# Patient Record
Sex: MALE | Race: WHITE | Employment: FULL TIME | URBAN - METROPOLITAN AREA
[De-identification: names, ages, dates, MRNs, and addresses within clinical notes are randomized per-mention and may not be internally consistent; named-entity substitution may affect disease eponyms.]

---

## 2024-04-30 ENCOUNTER — OFFICE VISIT (OUTPATIENT)
Dept: PRIMARY CARE | Facility: CLINIC | Age: 54
End: 2024-04-30
Payer: COMMERCIAL

## 2024-04-30 VITALS
TEMPERATURE: 98.1 F | HEIGHT: 74 IN | HEART RATE: 84 BPM | SYSTOLIC BLOOD PRESSURE: 168 MMHG | DIASTOLIC BLOOD PRESSURE: 96 MMHG | OXYGEN SATURATION: 98 % | WEIGHT: 315 LBS | BODY MASS INDEX: 40.43 KG/M2

## 2024-04-30 DIAGNOSIS — E11.9 TYPE 2 DIABETES MELLITUS WITHOUT COMPLICATION, WITHOUT LONG-TERM CURRENT USE OF INSULIN (MULTI): ICD-10-CM

## 2024-04-30 DIAGNOSIS — I10 PRIMARY HYPERTENSION: ICD-10-CM

## 2024-04-30 DIAGNOSIS — E66.01 CLASS 3 SEVERE OBESITY WITH SERIOUS COMORBIDITY AND BODY MASS INDEX (BMI) OF 40.0 TO 44.9 IN ADULT, UNSPECIFIED OBESITY TYPE (MULTI): ICD-10-CM

## 2024-04-30 DIAGNOSIS — M1A.09X0 CHRONIC GOUT OF MULTIPLE SITES, UNSPECIFIED CAUSE: ICD-10-CM

## 2024-04-30 DIAGNOSIS — Z00.00 ANNUAL PHYSICAL EXAM: Primary | ICD-10-CM

## 2024-04-30 DIAGNOSIS — M48.02 SPINAL STENOSIS IN CERVICAL REGION: ICD-10-CM

## 2024-04-30 PROCEDURE — 3080F DIAST BP >= 90 MM HG: CPT

## 2024-04-30 PROCEDURE — 4010F ACE/ARB THERAPY RXD/TAKEN: CPT

## 2024-04-30 PROCEDURE — 3077F SYST BP >= 140 MM HG: CPT

## 2024-04-30 PROCEDURE — 3008F BODY MASS INDEX DOCD: CPT

## 2024-04-30 PROCEDURE — 99213 OFFICE O/P EST LOW 20 MIN: CPT

## 2024-04-30 PROCEDURE — 99386 PREV VISIT NEW AGE 40-64: CPT

## 2024-04-30 RX ORDER — METFORMIN HYDROCHLORIDE 500 MG/1
1000 TABLET ORAL
COMMUNITY
Start: 2024-03-04 | End: 2024-06-05 | Stop reason: SDUPTHER

## 2024-04-30 RX ORDER — HYDROCHLOROTHIAZIDE 25 MG/1
25 TABLET ORAL DAILY
Qty: 30 TABLET | Refills: 1 | Status: SHIPPED | OUTPATIENT
Start: 2024-04-30 | End: 2024-06-05 | Stop reason: SDUPTHER

## 2024-04-30 RX ORDER — LISINOPRIL AND HYDROCHLOROTHIAZIDE 12.5; 2 MG/1; MG/1
1 TABLET ORAL DAILY
COMMUNITY
Start: 2024-04-15 | End: 2024-04-30 | Stop reason: DRUGHIGH

## 2024-04-30 RX ORDER — AMLODIPINE BESYLATE 10 MG/1
10 TABLET ORAL DAILY
COMMUNITY
Start: 2024-03-26 | End: 2024-06-05 | Stop reason: SDUPTHER

## 2024-04-30 RX ORDER — GABAPENTIN 300 MG/1
300 CAPSULE ORAL 3 TIMES DAILY
COMMUNITY
Start: 2024-04-15

## 2024-04-30 RX ORDER — LISINOPRIL 40 MG/1
40 TABLET ORAL DAILY
Qty: 30 TABLET | Refills: 1 | Status: SHIPPED | OUTPATIENT
Start: 2024-04-30 | End: 2024-06-05 | Stop reason: SDUPTHER

## 2024-04-30 RX ORDER — METHOCARBAMOL 500 MG/1
500 TABLET, FILM COATED ORAL 3 TIMES DAILY
COMMUNITY
Start: 2024-03-26 | End: 2024-04-05 | Stop reason: WASHOUT

## 2024-04-30 RX ORDER — ALLOPURINOL 300 MG/1
300 TABLET ORAL DAILY
COMMUNITY
Start: 2024-03-04 | End: 2024-06-05 | Stop reason: SDUPTHER

## 2024-04-30 RX ORDER — GLIPIZIDE 5 MG/1
TABLET ORAL
COMMUNITY
Start: 2024-03-04 | End: 2024-06-05 | Stop reason: SDUPTHER

## 2024-04-30 ASSESSMENT — PATIENT HEALTH QUESTIONNAIRE - PHQ9
SUM OF ALL RESPONSES TO PHQ9 QUESTIONS 1 AND 2: 1
10. IF YOU CHECKED OFF ANY PROBLEMS, HOW DIFFICULT HAVE THESE PROBLEMS MADE IT FOR YOU TO DO YOUR WORK, TAKE CARE OF THINGS AT HOME, OR GET ALONG WITH OTHER PEOPLE: NOT DIFFICULT AT ALL
1. LITTLE INTEREST OR PLEASURE IN DOING THINGS: SEVERAL DAYS
2. FEELING DOWN, DEPRESSED OR HOPELESS: NOT AT ALL

## 2024-04-30 ASSESSMENT — PAIN SCALES - GENERAL: PAINLEVEL: 5
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
## 2024-05-07 ASSESSMENT — ENCOUNTER SYMPTOMS
EYE ITCHING: 0
HEADACHES: 0
DIARRHEA: 0
NAUSEA: 0
NECK PAIN: 1
COUGH: 0
PALPITATIONS: 0
ADENOPATHY: 0
RECTAL PAIN: 0
ABDOMINAL PAIN: 0
DIZZINESS: 0
TROUBLE SWALLOWING: 0
DECREASED CONCENTRATION: 0
ARTHRALGIAS: 0
SHORTNESS OF BREATH: 0
PHOTOPHOBIA: 0
LIGHT-HEADEDNESS: 0
SINUS PRESSURE: 0
JOINT SWELLING: 0
SPEECH DIFFICULTY: 0
HEMATURIA: 0
WHEEZING: 0
EYE REDNESS: 0
WEAKNESS: 0
SORE THROAT: 0
POLYPHAGIA: 0
APPETITE CHANGE: 0
WOUND: 0
MYALGIAS: 0
NERVOUS/ANXIOUS: 0
COLOR CHANGE: 0
FEVER: 0
NUMBNESS: 1
DYSURIA: 0
CHILLS: 0
FREQUENCY: 0
HYPERACTIVE: 0
SLEEP DISTURBANCE: 0
CHEST TIGHTNESS: 0
FATIGUE: 0
ACTIVITY CHANGE: 0
UNEXPECTED WEIGHT CHANGE: 0
BLOOD IN STOOL: 0
ANAL BLEEDING: 0
CONSTIPATION: 0
TREMORS: 0
VOMITING: 0
POLYDIPSIA: 0
BACK PAIN: 0
BRUISES/BLEEDS EASILY: 0
EYE PAIN: 0

## 2024-05-08 ENCOUNTER — DOCUMENTATION (OUTPATIENT)
Dept: PRIMARY CARE | Facility: CLINIC | Age: 54
End: 2024-05-08
Payer: COMMERCIAL

## 2024-05-08 DIAGNOSIS — Z00.00 ANNUAL PHYSICAL EXAM: ICD-10-CM

## 2024-05-13 ENCOUNTER — OFFICE VISIT (OUTPATIENT)
Dept: PRIMARY CARE | Facility: CLINIC | Age: 54
End: 2024-05-13
Payer: COMMERCIAL

## 2024-05-13 VITALS — OXYGEN SATURATION: 97 % | SYSTOLIC BLOOD PRESSURE: 160 MMHG | HEART RATE: 101 BPM | DIASTOLIC BLOOD PRESSURE: 90 MMHG

## 2024-05-13 DIAGNOSIS — E11.9 TYPE 2 DIABETES MELLITUS WITHOUT COMPLICATION, WITHOUT LONG-TERM CURRENT USE OF INSULIN (MULTI): Primary | ICD-10-CM

## 2024-05-13 PROCEDURE — 99214 OFFICE O/P EST MOD 30 MIN: CPT

## 2024-05-13 PROCEDURE — 4010F ACE/ARB THERAPY RXD/TAKEN: CPT

## 2024-05-13 PROCEDURE — 3077F SYST BP >= 140 MM HG: CPT

## 2024-05-13 PROCEDURE — 3008F BODY MASS INDEX DOCD: CPT

## 2024-05-13 PROCEDURE — 3080F DIAST BP >= 90 MM HG: CPT

## 2024-05-13 RX ORDER — LISINOPRIL AND HYDROCHLOROTHIAZIDE 12.5; 2 MG/1; MG/1
1 TABLET ORAL
COMMUNITY
Start: 2024-04-15 | End: 2024-05-13 | Stop reason: ALTCHOICE

## 2024-05-13 ASSESSMENT — PATIENT HEALTH QUESTIONNAIRE - PHQ9
2. FEELING DOWN, DEPRESSED OR HOPELESS: NOT AT ALL
SUM OF ALL RESPONSES TO PHQ9 QUESTIONS 1 AND 2: 0
1. LITTLE INTEREST OR PLEASURE IN DOING THINGS: NOT AT ALL

## 2024-05-13 ASSESSMENT — ENCOUNTER SYMPTOMS
APPETITE CHANGE: 0
FEVER: 0
CHEST TIGHTNESS: 0
LIGHT-HEADEDNESS: 0
POLYPHAGIA: 0
COUGH: 0
HEADACHES: 0
ACTIVITY CHANGE: 0
UNEXPECTED WEIGHT CHANGE: 0
GASTROINTESTINAL NEGATIVE: 1
DIZZINESS: 0
SHORTNESS OF BREATH: 0
POLYDIPSIA: 0
PALPITATIONS: 0
FATIGUE: 0

## 2024-05-13 ASSESSMENT — PAIN SCALES - GENERAL: PAINLEVEL: 5

## 2024-05-13 NOTE — PROGRESS NOTES
Subjective   Patient ID: Juan Amin is a 53 y.o. male who presents for Follow-up (Blood work ).    YOLANDA Antonio presents to discussed abnormal lab work.  His A1C is 9.8%, he is a new patient to this provider, first meeting on 4/30/2024.  No previous labs or medical records to review receive from previous provider, request sent at his last visit.  He does not recall what his previous A1C results have been.  He is currently taking metformin 1,000 mg BID and glipizide 5 mg BID for diabetes. Reports his diet needs improvement and due to his weight, getting in extra movement/exercising is a great struggle. He will be seeing pain management on 6/3/2024 which may provide some pain relief options so he may be come more active.     Review of Systems   Constitutional:  Negative for activity change, appetite change, fatigue, fever and unexpected weight change.   Respiratory:  Negative for cough, chest tightness and shortness of breath.    Cardiovascular:  Negative for chest pain, palpitations and leg swelling.   Gastrointestinal: Negative.    Endocrine: Negative for polydipsia, polyphagia and polyuria.   Genitourinary: Negative.    Neurological:  Negative for dizziness, light-headedness and headaches.       Objective   /90 (BP Location: Left arm)   Pulse 101   SpO2 97%     Physical Exam  Vitals and nursing note reviewed.   Constitutional:       General: He is not in acute distress.     Appearance: Normal appearance.   Cardiovascular:      Rate and Rhythm: Normal rate and regular rhythm.      Heart sounds: Normal heart sounds.   Pulmonary:      Effort: Pulmonary effort is normal.      Breath sounds: Normal breath sounds.   Skin:     General: Skin is warm and dry.   Neurological:      General: No focal deficit present.      Mental Status: He is alert.         Assessment/Plan   Problem List Items Addressed This Visit    None  Visit Diagnoses         Codes    Type 2 diabetes mellitus without complication, without  long-term current use of insulin (Multi)    -  Primary  Chronic condition, not well controlled.  A1C 9.8%  Initiate Ozempic 0.25 mg as discussed x 4 weeks.  Explained intended effects, potential side effects, and schedule of dosages of the medication.  Continue metformin 1,000 mg BID and glipizide 5 mg BID as prescribed.   Discussed diet and exercise interventions at length.  Discussed consult with diabetic educator for further diet intervention.  Follow up in 3 weeks as previously scheduled to re-evaluate BP and we will plan to increase Ozempic dose if well tolerated.  E11.9    Relevant Medications    semaglutide 0.25 mg or 0.5 mg (2 mg/3 mL) pen injector (Start on 5/19/2024)

## 2024-05-24 PROBLEM — N52.1 ERECTILE DYSFUNCTION DUE TO TYPE 2 DIABETES MELLITUS (MULTI): Status: ACTIVE | Noted: 2019-02-22

## 2024-05-24 PROBLEM — D64.9 ANEMIA: Status: ACTIVE | Noted: 2022-04-09

## 2024-05-24 PROBLEM — E11.69 ERECTILE DYSFUNCTION DUE TO TYPE 2 DIABETES MELLITUS (MULTI): Status: ACTIVE | Noted: 2019-02-22

## 2024-05-24 PROBLEM — I62.9 INTRACRANIAL HEMORRHAGE (MULTI): Status: ACTIVE | Noted: 2023-01-26

## 2024-05-24 PROBLEM — S46.212A RUPTURE OF LEFT PROXIMAL BICEPS TENDON: Status: ACTIVE | Noted: 2021-05-20

## 2024-05-24 PROBLEM — E11.9 DIABETES MELLITUS (MULTI): Status: ACTIVE | Noted: 2019-11-29

## 2024-05-24 PROBLEM — M75.42 IMPINGEMENT SYNDROME OF LEFT SHOULDER: Status: ACTIVE | Noted: 2021-05-23

## 2024-05-24 PROBLEM — N52.9 ERECTILE DYSFUNCTION: Status: ACTIVE | Noted: 2020-03-17

## 2024-05-24 PROBLEM — M99.01 CERVICAL (NECK) REGION SOMATIC DYSFUNCTION: Status: ACTIVE | Noted: 2019-09-16

## 2024-05-24 PROBLEM — I10 ESSENTIAL HYPERTENSION: Status: ACTIVE | Noted: 2018-11-09

## 2024-05-24 PROBLEM — E53.8 VITAMIN B12 DEFICIENCY: Status: ACTIVE | Noted: 2021-12-10

## 2024-05-24 PROBLEM — E11.9 TYPE 2 DIABETES MELLITUS WITHOUT COMPLICATION, WITHOUT LONG-TERM CURRENT USE OF INSULIN (MULTI): Status: ACTIVE | Noted: 2019-09-16

## 2024-05-24 PROBLEM — E78.00 HIGH CHOLESTEROL: Status: ACTIVE | Noted: 2022-04-09

## 2024-05-24 PROBLEM — M1A.9XX0 CHRONIC GOUT WITHOUT TOPHUS: Status: ACTIVE | Noted: 2020-03-17

## 2024-05-24 PROBLEM — I21.4 NSTEMI (NON-ST ELEVATED MYOCARDIAL INFARCTION) (MULTI): Status: ACTIVE | Noted: 2022-11-22

## 2024-05-24 PROBLEM — M20.40 HAMMER TOE: Status: ACTIVE | Noted: 2019-11-29

## 2024-05-24 PROBLEM — E66.01 MORBID OBESITY (MULTI): Status: ACTIVE | Noted: 2019-09-16

## 2024-05-24 PROBLEM — E55.9 VITAMIN D DEFICIENCY: Status: ACTIVE | Noted: 2021-12-10

## 2024-05-28 PROBLEM — N52.9 ERECTILE DYSFUNCTION: Status: RESOLVED | Noted: 2020-03-17 | Resolved: 2024-05-28

## 2024-05-28 PROBLEM — M20.40 HAMMER TOE: Status: RESOLVED | Noted: 2019-11-29 | Resolved: 2024-05-28

## 2024-06-03 ENCOUNTER — OFFICE VISIT (OUTPATIENT)
Dept: PAIN MEDICINE | Facility: CLINIC | Age: 54
End: 2024-06-03
Payer: COMMERCIAL

## 2024-06-03 VITALS
OXYGEN SATURATION: 96 % | DIASTOLIC BLOOD PRESSURE: 80 MMHG | HEART RATE: 87 BPM | RESPIRATION RATE: 18 BRPM | BODY MASS INDEX: 40.43 KG/M2 | WEIGHT: 315 LBS | SYSTOLIC BLOOD PRESSURE: 160 MMHG | HEIGHT: 74 IN

## 2024-06-03 DIAGNOSIS — M48.02 SPINAL STENOSIS IN CERVICAL REGION: ICD-10-CM

## 2024-06-03 DIAGNOSIS — M47.812 CERVICAL SPONDYLOSIS WITHOUT MYELOPATHY: ICD-10-CM

## 2024-06-03 DIAGNOSIS — M54.12 CERVICAL RADICULAR PAIN: Primary | ICD-10-CM

## 2024-06-03 PROCEDURE — 3008F BODY MASS INDEX DOCD: CPT | Performed by: ANESTHESIOLOGY

## 2024-06-03 PROCEDURE — 1036F TOBACCO NON-USER: CPT | Performed by: ANESTHESIOLOGY

## 2024-06-03 PROCEDURE — 3077F SYST BP >= 140 MM HG: CPT | Performed by: ANESTHESIOLOGY

## 2024-06-03 PROCEDURE — 99204 OFFICE O/P NEW MOD 45 MIN: CPT | Performed by: ANESTHESIOLOGY

## 2024-06-03 PROCEDURE — 99214 OFFICE O/P EST MOD 30 MIN: CPT | Performed by: ANESTHESIOLOGY

## 2024-06-03 PROCEDURE — 3079F DIAST BP 80-89 MM HG: CPT | Performed by: ANESTHESIOLOGY

## 2024-06-03 PROCEDURE — 4010F ACE/ARB THERAPY RXD/TAKEN: CPT | Performed by: ANESTHESIOLOGY

## 2024-06-03 ASSESSMENT — LIFESTYLE VARIABLES
SKIP TO QUESTIONS 9-10: 0
AUDIT-C TOTAL SCORE: 2
HOW MANY STANDARD DRINKS CONTAINING ALCOHOL DO YOU HAVE ON A TYPICAL DAY: 1 OR 2
HOW OFTEN DO YOU HAVE SIX OR MORE DRINKS ON ONE OCCASION: LESS THAN MONTHLY
HOW OFTEN DO YOU HAVE A DRINK CONTAINING ALCOHOL: MONTHLY OR LESS

## 2024-06-03 ASSESSMENT — PATIENT HEALTH QUESTIONNAIRE - PHQ9
2. FEELING DOWN, DEPRESSED OR HOPELESS: NOT AT ALL
SUM OF ALL RESPONSES TO PHQ9 QUESTIONS 1 & 2: 0
1. LITTLE INTEREST OR PLEASURE IN DOING THINGS: NOT AT ALL

## 2024-06-03 ASSESSMENT — PAIN SCALES - GENERAL
PAINLEVEL_OUTOF10: 6
PAINLEVEL: 6

## 2024-06-03 ASSESSMENT — ENCOUNTER SYMPTOMS
CARDIOVASCULAR NEGATIVE: 1
WEAKNESS: 1
PSYCHIATRIC NEGATIVE: 1
EYES NEGATIVE: 1
NECK PAIN: 1
RESPIRATORY NEGATIVE: 1
ENDOCRINE NEGATIVE: 1
GASTROINTESTINAL NEGATIVE: 1
NUMBNESS: 1
CONSTITUTIONAL NEGATIVE: 1
HEMATOLOGIC/LYMPHATIC NEGATIVE: 1
NECK STIFFNESS: 1
MYALGIAS: 1
ALLERGIC/IMMUNOLOGIC NEGATIVE: 1

## 2024-06-03 ASSESSMENT — PAIN - FUNCTIONAL ASSESSMENT: PAIN_FUNCTIONAL_ASSESSMENT: 0-10

## 2024-06-03 ASSESSMENT — PAIN DESCRIPTION - DESCRIPTORS: DESCRIPTORS: NUMBNESS

## 2024-06-03 NOTE — PROGRESS NOTES
Subjective   Patient ID: Juan Amin is a 53 y.o. male who presents for Neck Pain.  Neck Pain   Associated symptoms include numbness and weakness.   Patient here today for a new patient evaluation of his neck and arm pain.  He was diagnosed with stenosis in 2014 and it was not constant at that time.  He was offered surgery in 2014 and he did not move forward with it at that time.  He rats his pain as a 6/10.  However, he electednot to have surgery because his pain was not constant.  When he move back to [place] recently he started to have pain in the left forearm that felt like a deep bruise.  He then started to get numbness in his 4th and 5ht digits as well as weakness in the right hand.  He has pain in his neck and shoulders.  He has a very hard time lifting any thing and he feels weak.  Writing and simple simple hand tasks have become very hard for him.    He works full time as a  for [employer] and if he looks over his right shoulder it will worsen the pain.    He reports that when he lays down his feet will go numb.  If he take shis shoes off he gets a numbness in his feet.  He also has noticed that his gait is off and he learn to the side.  He also had a fall at work a few weeks ago.      Review of Systems   Constitutional: Negative.    HENT: Negative.     Eyes: Negative.    Respiratory: Negative.     Cardiovascular: Negative.    Gastrointestinal: Negative.    Endocrine: Negative.    Genitourinary: Negative.    Musculoskeletal:  Positive for myalgias, neck pain and neck stiffness.   Skin: Negative.    Allergic/Immunologic: Negative.    Neurological:  Positive for weakness and numbness.   Hematological: Negative.    Psychiatric/Behavioral: Negative.         Objective   Physical Exam  Vitals and nursing note reviewed.   Constitutional:       Appearance: Normal appearance.   HENT:      Head: Normocephalic and atraumatic.      Right Ear: Ear canal and external ear normal.      Left Ear: Ear canal and external  ear normal.      Nose: Nose normal.      Mouth/Throat:      Mouth: Mucous membranes are moist.      Pharynx: Oropharynx is clear.   Eyes:      Conjunctiva/sclera: Conjunctivae normal.      Pupils: Pupils are equal, round, and reactive to light.   Cardiovascular:      Rate and Rhythm: Normal rate.   Pulmonary:      Effort: Pulmonary effort is normal. No respiratory distress.   Musculoskeletal:      Cervical back: Normal range of motion and neck supple.      Lumbar back: Tenderness present. Normal range of motion.   Skin:     General: Skin is warm and dry.   Neurological:      Mental Status: He is alert and oriented to person, place, and time.      Sensory: Sensory deficit (Bilateral C8 Deficit) present.      Motor: Weakness (wrist strength 4/5) present.      Coordination: Coordination is intact.      Gait: Gait is intact.      Deep Tendon Reflexes: Babinski sign absent on the right side. Babinski sign absent on the left side.      Reflex Scores:       Bicep reflexes are 1+ on the right side and 1+ on the left side.       Brachioradialis reflexes are 1+ on the right side and 1+ on the left side.       Patellar reflexes are 2+ on the right side and 2+ on the left side.     Comments: Barbosa Sign (-) BL  Spurlings (+) right   Psychiatric:         Mood and Affect: Mood normal.         Thought Content: Thought content normal.         Assessment/Plan   Problem List Items Addressed This Visit    None  Visit Diagnoses         Codes    Cervical radicular pain    -  Primary M54.12    Relevant Orders    MR cervical spine wo IV contrast    Spinal stenosis in cervical region     M48.02    Relevant Orders    MR cervical spine wo IV contrast    Cervical spondylosis without myelopathy     M47.812          I nice discussion with the patient today our plan will be as follows.    Radiology: Patient with a previous history of cervical spinal stenosis needing decompression in the past now presenting with worsening weakness and numbness  in upper and lower extremities as well as gait instability.  I am concerned that the patient does have cord compression and has signs of myelopathy.    I will send the patient for stat noncontrasted cervical MRI.    Physically: I will have the patient hold off on any physical therapy at this time given his acute neurological deficits until advanced imaging has been completed.    Psychologically: No issues at this time.    Medication: No changes at this time.    Duration: Worsening over the last 6 months.    Intervention: We will hold off on any intervention until stat MRI has been completed.  If there is any cord compression or signs of myelomalacia I will refer to spine surgery for surgical consultation.         Ector Crockett MD 06/03/24 1:07 PM

## 2024-06-05 ENCOUNTER — OFFICE VISIT (OUTPATIENT)
Dept: PRIMARY CARE | Facility: CLINIC | Age: 54
End: 2024-06-05
Payer: COMMERCIAL

## 2024-06-05 ENCOUNTER — HOSPITAL ENCOUNTER (OUTPATIENT)
Dept: RADIOLOGY | Facility: HOSPITAL | Age: 54
Discharge: HOME | End: 2024-06-05
Payer: COMMERCIAL

## 2024-06-05 VITALS
SYSTOLIC BLOOD PRESSURE: 150 MMHG | BODY MASS INDEX: 44.02 KG/M2 | OXYGEN SATURATION: 96 % | TEMPERATURE: 97.8 F | HEART RATE: 77 BPM | WEIGHT: 315 LBS | DIASTOLIC BLOOD PRESSURE: 92 MMHG

## 2024-06-05 DIAGNOSIS — M1A.09X0 CHRONIC GOUT OF MULTIPLE SITES, UNSPECIFIED CAUSE: ICD-10-CM

## 2024-06-05 DIAGNOSIS — M54.12 CERVICAL RADICULAR PAIN: ICD-10-CM

## 2024-06-05 DIAGNOSIS — E11.9 TYPE 2 DIABETES MELLITUS WITHOUT COMPLICATION, WITHOUT LONG-TERM CURRENT USE OF INSULIN (MULTI): Primary | ICD-10-CM

## 2024-06-05 DIAGNOSIS — M48.02 SPINAL STENOSIS IN CERVICAL REGION: ICD-10-CM

## 2024-06-05 DIAGNOSIS — I10 PRIMARY HYPERTENSION: ICD-10-CM

## 2024-06-05 PROCEDURE — 4010F ACE/ARB THERAPY RXD/TAKEN: CPT

## 2024-06-05 PROCEDURE — 72141 MRI NECK SPINE W/O DYE: CPT

## 2024-06-05 PROCEDURE — 3077F SYST BP >= 140 MM HG: CPT

## 2024-06-05 PROCEDURE — 72141 MRI NECK SPINE W/O DYE: CPT | Performed by: STUDENT IN AN ORGANIZED HEALTH CARE EDUCATION/TRAINING PROGRAM

## 2024-06-05 PROCEDURE — 3008F BODY MASS INDEX DOCD: CPT

## 2024-06-05 PROCEDURE — 3080F DIAST BP >= 90 MM HG: CPT

## 2024-06-05 PROCEDURE — 99214 OFFICE O/P EST MOD 30 MIN: CPT

## 2024-06-05 RX ORDER — HYDROCHLOROTHIAZIDE 25 MG/1
25 TABLET ORAL DAILY
Qty: 90 TABLET | Refills: 1 | Status: SHIPPED | OUTPATIENT
Start: 2024-06-05 | End: 2024-12-02

## 2024-06-05 RX ORDER — GLIPIZIDE 5 MG/1
5 TABLET ORAL
Qty: 180 TABLET | Refills: 1 | Status: SHIPPED | OUTPATIENT
Start: 2024-06-05

## 2024-06-05 RX ORDER — ALLOPURINOL 300 MG/1
300 TABLET ORAL DAILY
Qty: 90 TABLET | Refills: 1 | Status: SHIPPED | OUTPATIENT
Start: 2024-06-05 | End: 2024-12-02

## 2024-06-05 RX ORDER — METFORMIN HYDROCHLORIDE 500 MG/1
1000 TABLET ORAL
Qty: 360 TABLET | Refills: 1 | Status: SHIPPED | OUTPATIENT
Start: 2024-06-05

## 2024-06-05 RX ORDER — LISINOPRIL 40 MG/1
40 TABLET ORAL DAILY
Qty: 90 TABLET | Refills: 1 | Status: SHIPPED | OUTPATIENT
Start: 2024-06-05 | End: 2024-12-02

## 2024-06-05 RX ORDER — AMLODIPINE BESYLATE 10 MG/1
10 TABLET ORAL DAILY
Qty: 90 TABLET | Refills: 1 | Status: SHIPPED | OUTPATIENT
Start: 2024-06-05

## 2024-06-05 ASSESSMENT — PAIN SCALES - GENERAL: PAINLEVEL: 6

## 2024-06-05 NOTE — PROGRESS NOTES
Subjective   Patient ID: Juan Amin is a 53 y.o. male who presents for Diabetes, Hypertension, and Follow-up.    HPI   Juan presents for diabetes follow up.  Started Ozempic 0.25 mg 4 weeks ago.  Reports improvement in hunger.  Has no really made any changes in his diet.   He denies any adverse effects related to Ozempic use. Has had a 4 lb weight loss.   Not currently checking home glucose levels as he did not have access to his monitor.  He now has access and will begin monitoring as discussed.     Review of Systems   Constitutional:  Negative for activity change, appetite change, fatigue, fever and unexpected weight change.   Respiratory:  Negative for cough, chest tightness and shortness of breath.    Cardiovascular:  Negative for chest pain, palpitations and leg swelling.   Gastrointestinal: Negative.    Endocrine: Negative for polydipsia, polyphagia and polyuria.   Genitourinary: Negative.    Neurological:  Negative for dizziness, light-headedness and headaches.         Objective   BP (!) 150/92 (BP Location: Left arm)   Pulse 77   Temp 36.6 °C (97.8 °F) (Temporal)   Wt (!) 153 kg (338 lb 3.2 oz)   SpO2 96%   BMI 44.02 kg/m²     Physical Exam  Vitals and nursing note reviewed.   Constitutional:       General: He is not in acute distress.     Appearance: Normal appearance. He is not ill-appearing.   Neck:      Vascular: No carotid bruit.   Cardiovascular:      Rate and Rhythm: Normal rate and regular rhythm.      Pulses: Normal pulses.      Heart sounds: Normal heart sounds, S1 normal and S2 normal. No murmur heard.  Pulmonary:      Effort: Pulmonary effort is normal. No respiratory distress.      Breath sounds: Normal breath sounds and air entry.   Musculoskeletal:      Cervical back: Normal range of motion and neck supple. No rigidity or tenderness.      Right lower leg: No edema.      Left lower leg: No edema.   Lymphadenopathy:      Cervical: No cervical adenopathy.   Skin:     General: Skin  is warm and dry.      Capillary Refill: Capillary refill takes less than 2 seconds.   Neurological:      General: No focal deficit present.      Mental Status: He is alert. Mental status is at baseline.   Psychiatric:         Behavior: Behavior is cooperative.         Assessment/Plan   Problem List Items Addressed This Visit             ICD-10-CM    Type 2 diabetes mellitus without complication, without long-term current use of insulin (Multi) - Primary  Increase Ozempic to 0.5 mg subcutaneous weekly as discussed.   Explained intended effects, potential side effects, and schedule of dosages of the medication.  Continue work on diet - recommend lots of fruits and vegetables, lean protein like chicken, turkey, fish, beans and Greek yogurt. Try to choose healthier carbohydrate options like oatmeal, wheat bread and pasta, sweet potatoes. Limit sugary treats.  Check a fasting sugar first thing in the AM once daily and keep a log of the results to bring to your next office visit.  Please contact office if your sugars are consistently >140.  Reevaluate in 3 weeks. For medication adjustment, will plan to increase Ozempic to 1 mg subcutaneous weekly at next visit.    E11.9    Relevant Medications    semaglutide 0.25 mg or 0.5 mg (2 mg/3 mL) pen injector    glipiZIDE (Glucotrol) 5 mg tablet    metFORMIN (Glucophage) 500 mg tablet     Other Visit Diagnoses         Codes    Chronic gout of multiple sites, unspecified cause      Chronic condition, well controlled at this visit.   Continue allopurinol as prescribed. No recent flairs.   Follow up in 6 months.    M1A.09X0    Relevant Medications    allopurinol (Zyloprim) 300 mg tablet    Primary hypertension      Chronic condition, needs tighter control.  He reports he is in pain related chronic neck pain and feeling stress as he has an MRI scheduled after this appointment.  Stress and pain both likely contributing to elevated BP today.   Continue hydrochlorothiazide 25 mg daily,  amlodipine 10 mg daily, and lisinopril 40 mg daily as prescribed.   Explained intended effects, potential side effects, and schedule of dosages of the medication. I10    Relevant Medications    amLODIPine (Norvasc) 10 mg tablet    hydroCHLOROthiazide (HYDRODiuril) 25 mg tablet    lisinopril 40 mg tablet

## 2024-06-06 DIAGNOSIS — M48.02 SPINAL STENOSIS, CERVICAL REGION: Primary | ICD-10-CM

## 2024-06-09 ASSESSMENT — ENCOUNTER SYMPTOMS
LIGHT-HEADEDNESS: 0
CHEST TIGHTNESS: 0
FATIGUE: 0
PALPITATIONS: 0
POLYDIPSIA: 0
FEVER: 0
SHORTNESS OF BREATH: 0
HEADACHES: 0
POLYPHAGIA: 0
ACTIVITY CHANGE: 0
GASTROINTESTINAL NEGATIVE: 1
APPETITE CHANGE: 0
UNEXPECTED WEIGHT CHANGE: 0
DIZZINESS: 0
COUGH: 0

## 2024-06-17 ENCOUNTER — OFFICE VISIT (OUTPATIENT)
Dept: ORTHOPEDIC SURGERY | Facility: CLINIC | Age: 54
End: 2024-06-17
Payer: COMMERCIAL

## 2024-06-17 DIAGNOSIS — M48.02 SPINAL STENOSIS, CERVICAL REGION: Primary | ICD-10-CM

## 2024-06-17 DIAGNOSIS — G95.20 SPINAL CORD COMPRESSION (MULTI): ICD-10-CM

## 2024-06-17 DIAGNOSIS — M47.12 CERVICAL SPONDYLOSIS WITH MYELOPATHY: ICD-10-CM

## 2024-06-17 PROCEDURE — 3008F BODY MASS INDEX DOCD: CPT | Performed by: ORTHOPAEDIC SURGERY

## 2024-06-17 PROCEDURE — 99205 OFFICE O/P NEW HI 60 MIN: CPT | Performed by: ORTHOPAEDIC SURGERY

## 2024-06-17 PROCEDURE — 4010F ACE/ARB THERAPY RXD/TAKEN: CPT | Performed by: ORTHOPAEDIC SURGERY

## 2024-06-17 PROCEDURE — 99215 OFFICE O/P EST HI 40 MIN: CPT | Performed by: ORTHOPAEDIC SURGERY

## 2024-06-17 RX ORDER — SODIUM CHLORIDE, SODIUM LACTATE, POTASSIUM CHLORIDE, CALCIUM CHLORIDE 600; 310; 30; 20 MG/100ML; MG/100ML; MG/100ML; MG/100ML
100 INJECTION, SOLUTION INTRAVENOUS CONTINUOUS
OUTPATIENT
Start: 2024-06-17

## 2024-06-17 RX ORDER — ACETAMINOPHEN 325 MG/1
975 TABLET ORAL ONCE
OUTPATIENT
Start: 2024-06-17 | End: 2024-06-17

## 2024-06-17 RX ORDER — GABAPENTIN 600 MG/1
600 TABLET ORAL ONCE
OUTPATIENT
Start: 2024-06-17 | End: 2024-06-17

## 2024-06-17 ASSESSMENT — PAIN SCALES - GENERAL: PAINLEVEL_OUTOF10: 7

## 2024-06-17 ASSESSMENT — PAIN - FUNCTIONAL ASSESSMENT: PAIN_FUNCTIONAL_ASSESSMENT: 0-10

## 2024-06-17 NOTE — Clinical Note
June 17, 2024     Courtney Watts, APRN-CNP  [address] [place] [address]  [address]  [place] [address]    Patient: Juan Amin   YOB: 1970   Date of Visit: 6/17/2024       Dear Dr. Courtney Watts, APRN-CNP:    Thank you for referring Juan Amin to me for evaluation. Below are my notes for this consultation.  If you have questions, please do not hesitate to call me. I look forward to following your patient along with you.       Sincerely,     Benjie Chadwick MD      CC: Ector Crockett MD  ______________________________________________________________________________________    53-year-old male referred for evaluation of cervical spinal stenosis.  He has had many months of progressively worsening neck pain, bilateral hand and arm pain and numbness, as well as balance problems and fine motor disruption.  He has fallen several times, he feels like it is unsafe for him to work and his boss does not want him working any longer due to fear of risk of injury.  He feels a deep ache in the bilateral forearms, left side worse than right.  There is constant numbness in the hands.  He has noticed a change in his writing as well.  Denies bowel or bladder incontinence.    Medical history is significant for type 2 diabetes.  He also has a history of coronary artery disease.  He is currently taking semaglutide.    He does not smoke.    On exam he is well-appearing and in no distress.  He walks with a broad-based, ataxic gait.  Unable to perform tandem heel-to-toe walking without falling.  He has bilateral intrinsic and  strength weakness graded at 3/5 with mild intrinsic weakness.  Hyperreflexia is present in the upper extremities bilaterally, positive Tree sign and positive inverted radial reflex bilaterally.    I personally reviewed x-rays and MRI of the cervical spine.  These demonstrate cervical kyphosis.  MRI demonstrates moderate central stenosis with spinal cord flattening at C4-5, severe central stenosis  with compression of the spinal cord at C5-6 and C6-7.    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  This is causing gait instability and classic myelopathy.  He is a danger to himself at work at this time and I agree that he should not be working until this problem is treated.  I explained that the goal of surgery is to decompress the spinal cord, and the majority of patients do experience improvement in their balance and coordination however there are no guarantees.  The primary goal is to halt the progression and he understands this.    I discussed the risks of surgery including bleeding, infection, paralysis, dysphagia, hoarseness, biceps palsy, muscle weakness, CSF leak, bowel or bladder dysfunction, incomplete resolution of pain or numbness, possible worsening of preoperative symptoms, DVT/PE, heart attack, stroke, and other unforeseen medical and anesthesia complications.  He verbalized understanding of the risks, benefits, and alternatives to surgical treatment.  The plan will be for C4-7 anterior cervical discectomy and fusion.  Surgery was scheduled for July 2 at [facility].      *This note was dictated using speech recognition software and was not corrected for spelling or grammatical errors*    Past Medical History:   Diagnosis Date    Allergic     Diabetes mellitus (Multi)     Hypertension     Neck pain          Current Outpatient Medications:     allopurinol (Zyloprim) 300 mg tablet, Take 1 tablet (300 mg) by mouth once daily. as directed, Disp: 90 tablet, Rfl: 1    amLODIPine (Norvasc) 10 mg tablet, Take 1 tablet (10 mg) by mouth once daily., Disp: 90 tablet, Rfl: 1    gabapentin (Neurontin) 300 mg capsule, Take 1 capsule (300 mg) by mouth 3 times a day., Disp: , Rfl:     glipiZIDE (Glucotrol) 5 mg tablet, Take 1 tablet (5 mg) by mouth 2 times a day before meals., Disp: 180 tablet, Rfl: 1    hydroCHLOROthiazide (HYDRODiuril) 25 mg tablet, Take 1  tablet (25 mg) by mouth once daily., Disp: 90 tablet, Rfl: 1    lisinopril 40 mg tablet, Take 1 tablet (40 mg) by mouth once daily., Disp: 90 tablet, Rfl: 1    metFORMIN (Glucophage) 500 mg tablet, Take 2 tablets (1,000 mg) by mouth 2 times daily (morning and late afternoon)., Disp: 360 tablet, Rfl: 1    semaglutide 0.25 mg or 0.5 mg (2 mg/3 mL) pen injector, Inject 0.5 mg under the skin 1 (one) time per week., Disp: 3 mL, Rfl: 0     Social History     Socioeconomic History    Marital status:      Spouse name: Not on file    Number of children: Not on file    Years of education: Not on file    Highest education level: Not on file   Occupational History    Not on file   Tobacco Use    Smoking status: Never    Smokeless tobacco: Never   Substance and Sexual Activity    Alcohol use: Yes     Comment: I dont drink weekly    Drug use: Never    Sexual activity: Yes     Partners: Female     Birth control/protection: None     Comment: Sex is with my wife   Other Topics Concern    Not on file   Social History Narrative    Not on file     Social Determinants of Health     Financial Resource Strain: Low Risk  (4/6/2022)    Received from [facility]    Overall Financial Resource Strain (CARDIA)     Difficulty of Paying Living Expenses: Not hard at all   Food Insecurity: No Food Insecurity (12/21/2023)    Received from [facility]    Hunger Vital Sign     Worried About Running Out of Food in the Last Year: Never true     Ran Out of Food in the Last Year: Never true   Transportation Needs: No Transportation Needs (12/21/2023)    Received from [facility]RSN - Transportation      In the past 12 months, has lack of reliable transportation kept you from medical appointments, meetings, work or from getting things needed for daily living?: Not on file   Physical Activity: Patient Declined (5/19/2023)    Received from [facility]    Exercise Vital Sign     Days of Exercise per Week: Patient declined     Minutes of Exercise per Session: Patient declined   Stress: No Stress Concern Present (4/6/2022)    Received from [facility]    [place] [employer] - Occupational Stress Questionnaire     Feeling of Stress : Not at all   Social Connections: Unknown (5/19/2023)    Received from [facility]    Social Connection and Isolation Panel [NHANES]     Frequency of Communication with Friends and Family: Patient declined     Frequency of Social Gatherings with Friends and Family: Patient declined     Attends Ep[employer]op[employer] Services: Patient declined     Active Member of Clubs or Organizations: Not on file     Attends Club or Organization Meetings: Not on file     Marital Status: Not on file   Intimate Partner Violence: Not At Risk (12/21/2023)    Received from [facility]    PRAPARE - Interpersonal Safety     Do you feel physically and emotionally safe? (A safe environment is one in which a person is not insulted, talked down to, threatened with harm, or screamed or cursed at.): Not on file   Housing Stability: Low Risk  (12/21/2023)    Received from [facility]    AA[facility] SN - Housing Stability     Are you worried or concerned that in the next two months you may not have stable housing that you own, rent, or stay in as a part of a household?: Not on file     Think about the place you live. Do you have problems with any of the following? (check all that apply): None of the above       Benjie Chadwick MD  Director, [facility] [place]   Department of Orthopaedic Surgery  [facility]  [address] [place] [address]  [phone]

## 2024-06-17 NOTE — LETTER
June 17, 2024     Patient: Juan Amin   YOB: 1970   Date of Visit: 6/17/2024       To Whom It May Concern:    It is my medical opinion that Juan Amin  is medically unable to work at this time.  He is currently under my care for spinal cord compression and will be undergoing surgery on July 2, 2024.  I anticipate that he will be able to return to work within 6 to 8 weeks following surgery, however this will be evaluated further following his operation .    If you have any questions or concerns, please don't hesitate to call.         Sincerely,        Benjie Chadwick MD    CC: No Recipients

## 2024-06-17 NOTE — PROGRESS NOTES
53-year-old male referred for evaluation of cervical spinal stenosis.  He has had many months of progressively worsening neck pain, bilateral hand and arm pain and numbness, as well as balance problems and fine motor disruption.  He has fallen several times, he feels like it is unsafe for him to work and his boss does not want him working any longer due to fear of risk of injury.  He feels a deep ache in the bilateral forearms, left side worse than right.  There is constant numbness in the hands.  He has noticed a change in his writing as well.  Denies bowel or bladder incontinence.    Medical history is significant for type 2 diabetes.  He also has a history of coronary artery disease.  He is currently taking semaglutide.    He does not smoke.    On exam he is well-appearing and in no distress.  He walks with a broad-based, ataxic gait.  Unable to perform tandem heel-to-toe walking without falling.  He has bilateral intrinsic and  strength weakness graded at 3/5 with mild intrinsic weakness.  Hyperreflexia is present in the upper extremities bilaterally, positive Tree sign and positive inverted radial reflex bilaterally.    I personally reviewed x-rays and MRI of the cervical spine.  These demonstrate cervical kyphosis.  MRI demonstrates moderate central stenosis with spinal cord flattening at C4-5, severe central stenosis with compression of the spinal cord at C5-6 and C6-7.    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  This is causing gait instability and classic myelopathy.  He is a danger to himself at work at this time and I agree that he should not be working until this problem is treated.  I explained that the goal of surgery is to decompress the spinal cord, and the majority of patients do experience improvement in their balance and coordination however there are no guarantees.  The primary goal is to halt the progression and he understands this.    I discussed  the risks of surgery including bleeding, infection, paralysis, dysphagia, hoarseness, biceps palsy, muscle weakness, CSF leak, bowel or bladder dysfunction, incomplete resolution of pain or numbness, possible worsening of preoperative symptoms, DVT/PE, heart attack, stroke, and other unforeseen medical and anesthesia complications.  He verbalized understanding of the risks, benefits, and alternatives to surgical treatment.  The plan will be for C4-7 anterior cervical discectomy and fusion.  Surgery was scheduled for July 2 at [facility].      *This note was dictated using speech recognition software and was not corrected for spelling or grammatical errors*    Past Medical History:   Diagnosis Date    Allergic     Diabetes mellitus (Multi)     Hypertension     Neck pain          Current Outpatient Medications:     allopurinol (Zyloprim) 300 mg tablet, Take 1 tablet (300 mg) by mouth once daily. as directed, Disp: 90 tablet, Rfl: 1    amLODIPine (Norvasc) 10 mg tablet, Take 1 tablet (10 mg) by mouth once daily., Disp: 90 tablet, Rfl: 1    gabapentin (Neurontin) 300 mg capsule, Take 1 capsule (300 mg) by mouth 3 times a day., Disp: , Rfl:     glipiZIDE (Glucotrol) 5 mg tablet, Take 1 tablet (5 mg) by mouth 2 times a day before meals., Disp: 180 tablet, Rfl: 1    hydroCHLOROthiazide (HYDRODiuril) 25 mg tablet, Take 1 tablet (25 mg) by mouth once daily., Disp: 90 tablet, Rfl: 1    lisinopril 40 mg tablet, Take 1 tablet (40 mg) by mouth once daily., Disp: 90 tablet, Rfl: 1    metFORMIN (Glucophage) 500 mg tablet, Take 2 tablets (1,000 mg) by mouth 2 times daily (morning and late afternoon)., Disp: 360 tablet, Rfl: 1    semaglutide 0.25 mg or 0.5 mg (2 mg/3 mL) pen injector, Inject 0.5 mg under the skin 1 (one) time per week., Disp: 3 mL, Rfl: 0     Social History     Socioeconomic History    Marital status:      Spouse name: Not on file    Number of children: Not on file    Years of education: Not on  file    Highest education level: Not on file   Occupational History    Not on file   Tobacco Use    Smoking status: Never    Smokeless tobacco: Never   Substance and Sexual Activity    Alcohol use: Yes     Comment: I dont drink weekly    Drug use: Never    Sexual activity: Yes     Partners: Female     Birth control/protection: None     Comment: Sex is with my wife   Other Topics Concern    Not on file   Social History Narrative    Not on file     Social Determinants of Health     Financial Resource Strain: Low Risk  (4/6/2022)    Received from [facility]    Overall Financial Resource Strain (CARDIA)     Difficulty of Paying Living Expenses: Not hard at all   Food Insecurity: No Food Insecurity (12/21/2023)    Received from [facility]    Hunger Vital Sign     Worried About Running Out of Food in the Last Year: Never true     Ran Out of Food in the Last Year: Never true   Transportation Needs: No Transportation Needs (12/21/2023)    [facility]      In the past 12 months, has lack of reliable transportation kept you from medical appointments, meetings, work or from getting things needed for daily living?: Not on file   Physical Activity: Patient Declined (5/19/2023)    Received from [facility]    Exercise Vital Sign     Days of Exercise per Week: Patient declined     Minutes of Exercise per Session: Patient declined   Stress: No Stress Concern Present (4/6/2022)    Received from [facility]    [place] [place] [employer] - Occupational Stress Questionnaire     Feeling of Stress : Not at all   Social Connections: Unknown (5/19/2023)    Received from [facility]    Social Connection and Isolation Panel [NHANES]     Frequency of Communication with Friends and Family: Patient declined      Frequency of Social Gatherings with Friends and Family: Patient declined     Attends Mormonism Services: Patient declined     Active Member of Clubs or Organizations: Not on file     Attends Club or Organization Meetings: Not on file     Marital Status: Not on file   Intimate Partner Violence: Not At Risk (12/21/2023)    Received from [facility] - Interpersonal Safety     Do you feel physically and emotionally safe? (A safe environment is one in which a person is not insulted, talked down to, threatened with harm, or screamed or cursed at.): Not on file   Housing Stability: Low Risk  (12/21/2023)    Received from [facility] - Housing Stability     Are you worried or concerned that in the next two months you may not have stable housing that you own, rent, or stay in as a part of a household?: Not on file     Think about the place you live. Do you have problems with any of the following? (check all that apply): None of the above       Benjie Chadwick MD  Director, [facility] [place]   Department of Orthopaedic Surgery  [facility]  [address] [place] [address]  [phone]

## 2024-06-19 ENCOUNTER — CLINICAL SUPPORT (OUTPATIENT)
Dept: PREADMISSION TESTING | Facility: HOSPITAL | Age: 54
End: 2024-06-19
Payer: COMMERCIAL

## 2024-06-19 RX ORDER — BISMUTH SUBSALICYLATE 262 MG
1 TABLET,CHEWABLE ORAL DAILY
COMMUNITY

## 2024-06-19 RX ORDER — ASPIRIN 81 MG/1
81 TABLET ORAL DAILY
COMMUNITY

## 2024-06-19 RX ORDER — IBUPROFEN 100 MG/5ML
1000 SUSPENSION, ORAL (FINAL DOSE FORM) ORAL DAILY
COMMUNITY

## 2024-06-19 NOTE — CPM/PAT NURSE NOTE
CPM/PAT Nurse Note      Name: Juan HERNANDEZ Amin (Juan HERNANDEZ Brennan)  /Age: 1970/53 y.o.       Past Medical History:   Diagnosis Date    Allergic     Cervical radicular pain     Cervical spondylosis without myelopathy     Gout     Hypertension     Neck pain     Obesity     Spinal stenosis     Stroke (Multi) 2022    CT head was obtained which showed small intraparenchymal hemorrhage in the right thalamus    Type 2 diabetes mellitus (Multi)     Uncontrolled A1C 9.8 on - (found results in media tab), follows with PCP who initated Ozempic also on metformin and glipizide       Past Surgical History:   Procedure Laterality Date    EYE SURGERY      OTHER SURGICAL HISTORY      facial reconstruction surgery- as a child    OTHER SURGICAL HISTORY      fracture surgery- broken leg as a child       Patient  reports being sexually active and has had partner(s) who are female. He reports using the following method of birth control/protection: None.    No family history on file.    No Known Allergies    Prior to Admission medications    Medication Sig Start Date End Date Taking? Authorizing Provider   allopurinol (Zyloprim) 300 mg tablet Take 1 tablet (300 mg) by mouth once daily. as directed 24  GARY Javed   amLODIPine (Norvasc) 10 mg tablet Take 1 tablet (10 mg) by mouth once daily. 24   GARY Javed   ascorbic acid (Vitamin C) 1,000 mg tablet Take 1 tablet (1,000 mg) by mouth once daily.    Historical Provider, MD   aspirin 81 mg EC tablet Take 1 tablet (81 mg) by mouth once daily.    Historical Provider, MD   gabapentin (Neurontin) 300 mg capsule Take 1 capsule (300 mg) by mouth once daily at bedtime. 4/15/24   Historical Provider, MD   glipiZIDE (Glucotrol) 5 mg tablet Take 1 tablet (5 mg) by mouth 2 times a day before meals. 24   GARY Javed   hydroCHLOROthiazide (HYDRODiuril) 25 mg tablet Take 1 tablet (25 mg) by mouth once daily. 24   GARY Javed   lisinopril 40 mg tablet Take 1 tablet (40 mg) by mouth once daily. 6/5/24 12/2/24  GARY Javed   metFORMIN (Glucophage) 500 mg tablet Take 2 tablets (1,000 mg) by mouth 2 times daily (morning and late afternoon). 6/5/24   GARY Javed   multivitamin tablet Take 1 tablet by mouth once daily.    Historical Provider, MD   semaglutide 0.25 mg or 0.5 mg (2 mg/3 mL) pen injector Inject 0.5 mg under the skin 1 (one) time per week.  Patient taking differently: Inject 0.5 mg under the skin 1 (one) time per week. Takes on Tuesdays 6/5/24   GARY Javed   methocarbamol (Robaxin) 500 mg tablet Take 1 tablet (500 mg) by mouth 3 times a day. 3/26/24 4/5/24  Historical Provider, MD LETY REYES     DASI Risk Score    No data to display       Caprini DVT Assessment    No data to display       Modified Frailty Index    No data to display       CHADS2 Stroke Risk  Current as of 12 minutes ago        N/A 3 to 100%: High Risk   2 to < 3%: Medium Risk   0 to < 2%: Low Risk     Last Change: N/A          This score determines the patient's risk of having a stroke if the patient has atrial fibrillation.        This score is not applicable to this patient. Components are not calculated.          Revised Cardiac Risk Index    No data to display       Apfel Simplified Score    No data to display       Risk Analysis Index Results This Encounter    No data found in the last 1 encounters.       Scheduled for C4-C7 ACDF on 07/02/24 with Dr. Chadwick.    PCP: GARY Montano, LOV 06/50/24, next appointment scheduled for 06/27/24    Orthopaedic Surgery: Benjie Chadwick MD LOV 06/17/24 or evaluation of cervical spinal stenosis.     -MR Cervical Spine: 06/05/24  IMPRESSION:  Multilevel degenerative changes with up to severe canal stenosis and  neural foraminal narrowing most prominent at C5-C6 and C6-C7.    Pain Management: Ector Crockett MD LOV 06/03/24 seen for neck  pain.    -MR Brain: 22  Small hemorrhagic infarct in the right thalamic region. This corresponds to the  CT findings.     -Transthoracic ECHO: 22  Impression:  Limited visualization due to body habitus and poor acoustical windows.  Normal LV ejection fraction (LVE: 60-64%). Grade 1 (mild) LV diastolic dysfunction. Normal Sized LV. Mild LV concentric hypertrophy.  Mildly dilated left atrium.    -EK2022  Normal sinus rhythm   Left axis deviation   Pulmonary disease pattern   Abnormal ECG     Nurse Plan of Action:   Medication Instructions:  Instructed to hold vitamins, supplements, and NSAIDs 7 days prior to surgery.      Aisha Garrett RN  Pre Admission Testing

## 2024-06-21 ENCOUNTER — HOSPITAL ENCOUNTER (OUTPATIENT)
Dept: CARDIOLOGY | Facility: HOSPITAL | Age: 54
Discharge: HOME | End: 2024-06-21
Payer: COMMERCIAL

## 2024-06-21 ENCOUNTER — PRE-ADMISSION TESTING (OUTPATIENT)
Dept: PREADMISSION TESTING | Facility: HOSPITAL | Age: 54
End: 2024-06-21
Payer: COMMERCIAL

## 2024-06-21 VITALS
SYSTOLIC BLOOD PRESSURE: 146 MMHG | WEIGHT: 315 LBS | HEIGHT: 74 IN | TEMPERATURE: 98.1 F | OXYGEN SATURATION: 98 % | BODY MASS INDEX: 40.43 KG/M2 | HEART RATE: 88 BPM | DIASTOLIC BLOOD PRESSURE: 78 MMHG

## 2024-06-21 DIAGNOSIS — R29.818 SUSPECTED SLEEP APNEA: ICD-10-CM

## 2024-06-21 DIAGNOSIS — G95.20 SPINAL CORD COMPRESSION (MULTI): ICD-10-CM

## 2024-06-21 DIAGNOSIS — M47.12 CERVICAL SPONDYLOSIS WITH MYELOPATHY: ICD-10-CM

## 2024-06-21 DIAGNOSIS — M48.02 SPINAL STENOSIS, CERVICAL REGION: ICD-10-CM

## 2024-06-21 DIAGNOSIS — Z01.818 PREOPERATIVE EXAMINATION: Primary | ICD-10-CM

## 2024-06-21 LAB
ABO GROUP (TYPE) IN BLOOD: NORMAL
ANION GAP SERPL CALC-SCNC: 17 MMOL/L (ref 10–20)
ANTIBODY SCREEN: NORMAL
APTT PPP: 33 SECONDS (ref 27–38)
BASOPHILS # BLD AUTO: 0.02 X10*3/UL (ref 0–0.1)
BASOPHILS NFR BLD AUTO: 0.2 %
BUN SERPL-MCNC: 21 MG/DL (ref 6–23)
CALCIUM SERPL-MCNC: 9.3 MG/DL (ref 8.6–10.6)
CHLORIDE SERPL-SCNC: 99 MMOL/L (ref 98–107)
CO2 SERPL-SCNC: 29 MMOL/L (ref 21–32)
CREAT SERPL-MCNC: 1.1 MG/DL (ref 0.5–1.3)
EGFRCR SERPLBLD CKD-EPI 2021: 79 ML/MIN/1.73M*2
EOSINOPHIL # BLD AUTO: 0.23 X10*3/UL (ref 0–0.7)
EOSINOPHIL NFR BLD AUTO: 2.2 %
ERYTHROCYTE [DISTWIDTH] IN BLOOD BY AUTOMATED COUNT: 13.4 % (ref 11.5–14.5)
GLUCOSE SERPL-MCNC: 186 MG/DL (ref 74–99)
HCT VFR BLD AUTO: 44.2 % (ref 41–52)
HGB BLD-MCNC: 14.5 G/DL (ref 13.5–17.5)
IMM GRANULOCYTES # BLD AUTO: 0.06 X10*3/UL (ref 0–0.7)
IMM GRANULOCYTES NFR BLD AUTO: 0.6 % (ref 0–0.9)
INR PPP: 1 (ref 0.9–1.1)
LYMPHOCYTES # BLD AUTO: 1.59 X10*3/UL (ref 1.2–4.8)
LYMPHOCYTES NFR BLD AUTO: 15.5 %
MCH RBC QN AUTO: 28.3 PG (ref 26–34)
MCHC RBC AUTO-ENTMCNC: 32.8 G/DL (ref 32–36)
MCV RBC AUTO: 86 FL (ref 80–100)
MONOCYTES # BLD AUTO: 0.53 X10*3/UL (ref 0.1–1)
MONOCYTES NFR BLD AUTO: 5.2 %
NEUTROPHILS # BLD AUTO: 7.83 X10*3/UL (ref 1.2–7.7)
NEUTROPHILS NFR BLD AUTO: 76.3 %
NRBC BLD-RTO: 0 /100 WBCS (ref 0–0)
PLATELET # BLD AUTO: 225 X10*3/UL (ref 150–450)
POTASSIUM SERPL-SCNC: 3.8 MMOL/L (ref 3.5–5.3)
PROTHROMBIN TIME: 10.9 SECONDS (ref 9.8–12.8)
RBC # BLD AUTO: 5.12 X10*6/UL (ref 4.5–5.9)
RH FACTOR (ANTIGEN D): NORMAL
SODIUM SERPL-SCNC: 141 MMOL/L (ref 136–145)
WBC # BLD AUTO: 10.3 X10*3/UL (ref 4.4–11.3)

## 2024-06-21 PROCEDURE — 99205 OFFICE O/P NEW HI 60 MIN: CPT | Performed by: NURSE PRACTITIONER

## 2024-06-21 PROCEDURE — 86901 BLOOD TYPING SEROLOGIC RH(D): CPT

## 2024-06-21 PROCEDURE — 87081 CULTURE SCREEN ONLY: CPT

## 2024-06-21 PROCEDURE — 85025 COMPLETE CBC W/AUTO DIFF WBC: CPT

## 2024-06-21 PROCEDURE — 93005 ELECTROCARDIOGRAM TRACING: CPT

## 2024-06-21 PROCEDURE — 36415 COLL VENOUS BLD VENIPUNCTURE: CPT

## 2024-06-21 PROCEDURE — 82374 ASSAY BLOOD CARBON DIOXIDE: CPT

## 2024-06-21 PROCEDURE — 85610 PROTHROMBIN TIME: CPT

## 2024-06-21 RX ORDER — CHLORHEXIDINE GLUCONATE ORAL RINSE 1.2 MG/ML
SOLUTION DENTAL
Qty: 473 ML | Refills: 0 | Status: SHIPPED | OUTPATIENT
Start: 2024-06-21

## 2024-06-21 RX ORDER — CHLORHEXIDINE GLUCONATE 40 MG/ML
SOLUTION TOPICAL 2 TIMES DAILY
Qty: 473 ML | Refills: 0 | Status: SHIPPED | OUTPATIENT
Start: 2024-06-21 | End: 2024-06-26

## 2024-06-21 ASSESSMENT — ENCOUNTER SYMPTOMS
MUSCULOSKELETAL NEGATIVE: 1
CARDIOVASCULAR NEGATIVE: 1
NUMBNESS: 1
GASTROINTESTINAL NEGATIVE: 1
ENDOCRINE NEGATIVE: 1
NECK NEGATIVE: 1
EYES NEGATIVE: 1
CONSTITUTIONAL NEGATIVE: 1
RESPIRATORY NEGATIVE: 1

## 2024-06-21 ASSESSMENT — DUKE ACTIVITY SCORE INDEX (DASI)
CAN YOU WALK A BLOCK OR TWO ON LEVEL GROUND: YES
CAN YOU DO MODERATE WORK AROUND THE HOUSE LIKE VACUUMING, SWEEPING FLOORS OR CARRYING GROCERIES: YES
CAN YOU PARTICIPATE IN STRENOUS SPORTS LIKE SWIMMING, SINGLES TENNIS, FOOTBALL, BASKETBALL, OR SKIING: NO
CAN YOU DO HEAVY WORK AROUND THE HOUSE LIKE SCRUBBING FLOORS OR LIFTING AND MOVING HEAVY FURNITURE: NO
CAN YOU PARTICIPATE IN MODERATE RECREATIONAL ACTIVITIES LIKE GOLF, BOWLING, DANCING, DOUBLES TENNIS OR THROWING A BASEBALL OR FOOTBALL: NO
CAN YOU WALK INDOORS, SUCH AS AROUND YOUR HOUSE: YES
CAN YOU DO YARD WORK LIKE RAKING LEAVES, WEEDING OR PUSHING A MOWER: NO
CAN YOU CLIMB A FLIGHT OF STAIRS OR WALK UP A HILL: YES
DASI METS SCORE: 5.7
CAN YOU HAVE SEXUAL RELATIONS: YES
CAN YOU DO LIGHT WORK AROUND THE HOUSE LIKE DUSTING OR WASHING DISHES: YES
TOTAL_SCORE: 24.2
CAN YOU TAKE CARE OF YOURSELF (EAT, DRESS, BATHE, OR USE TOILET): YES
CAN YOU RUN A SHORT DISTANCE: NO

## 2024-06-21 ASSESSMENT — LIFESTYLE VARIABLES: SMOKING_STATUS: NONSMOKER

## 2024-06-21 NOTE — CPM/PAT H&P
CPM/PAT Evaluation       Name: Juan Amin (Juan Amin)  /Age: 1970/53 y.o.     Visit Type:   In-Person       Chief Complaint: Cervical spondylosis with myelopathy and cord compression scheduled for surgery    HPI: Patient is a 53-year-old male scheduled for C4-C7 anterior cervical discectomy and fusion on 2024 for treatment of cervical spondylosis with myelopathy and cord compression.  The patient is referred by Dr. Benjie Chadwick for preoperative evaluation of osteoarthritis, cervical spondylosis with myelopathy and cord compression, GERD, gout, hypertension, morbid obesity, CVA (intraparenchymal hemorrhage in the right thalamus) in 2022 secondary to uncontrolled hypertension, uncontrolled NIDDM.    Past Medical History:   Diagnosis Date    Allergic     Arthritis     Cervical radicular pain     Cervical spondylosis without myelopathy     GERD (gastroesophageal reflux disease)     Gout     Hypertension     Neck pain     Obesity     Spinal stenosis 2014    Stroke (Multi) 2022    CT head was obtained which showed small intraparenchymal hemorrhage in the right thalamus    Type 2 diabetes mellitus (Multi)     Uncontrolled A1C 9.8 on - (found results in media tab), follows with PCP who initated Ozempic also on metformin and glipizide       Past Surgical History:   Procedure Laterality Date    EYE SURGERY      OTHER SURGICAL HISTORY      facial reconstruction surgery- as a child    OTHER SURGICAL HISTORY      fracture surgery- broken leg as a child       Patient  reports being sexually active and has had partner(s) who are female. He reports using the following method of birth control/protection: None.    Family History   Problem Relation Name Age of Onset    Osteoarthritis Mother      Coronary artery disease Father         No Known Allergies    Prior to Admission medications    Medication Sig Start Date End Date Taking? Authorizing Provider   allopurinol (Zyloprim) 300 mg  tablet Take 1 tablet (300 mg) by mouth once daily. as directed 6/5/24 12/2/24 Yes GARY Javed   amLODIPine (Norvasc) 10 mg tablet Take 1 tablet (10 mg) by mouth once daily. 6/5/24  Yes GARY Javed   ascorbic acid (Vitamin C) 1,000 mg tablet Take 1 tablet (1,000 mg) by mouth once daily.   Yes Historical Provider, MD   aspirin 81 mg EC tablet Take 1 tablet (81 mg) by mouth once daily.   Yes Historical Provider, MD   gabapentin (Neurontin) 300 mg capsule Take 1 capsule (300 mg) by mouth once daily at bedtime. 4/15/24  Yes Historical Provider, MD   glipiZIDE (Glucotrol) 5 mg tablet Take 1 tablet (5 mg) by mouth 2 times a day before meals. 6/5/24  Yes GARY Javed   hydroCHLOROthiazide (HYDRODiuril) 25 mg tablet Take 1 tablet (25 mg) by mouth once daily. 6/5/24 12/2/24 Yes GARY Javed   lisinopril 40 mg tablet Take 1 tablet (40 mg) by mouth once daily. 6/5/24 12/2/24 Yes GARY Javed   metFORMIN (Glucophage) 500 mg tablet Take 2 tablets (1,000 mg) by mouth 2 times daily (morning and late afternoon). 6/5/24  Yes GARY Javed   multivitamin tablet Take 1 tablet by mouth once daily.   Yes Historical Provider, MD   semaglutide 0.25 mg or 0.5 mg (2 mg/3 mL) pen injector Inject 0.5 mg under the skin 1 (one) time per week.  Patient taking differently: Inject 0.5 mg under the skin 1 (one) time per week. Takes on Tuesdays 6/5/24  Yes GARY Javed   chlorhexidine (Hibiclens) 4 % external liquid Apply topically 2 times a day for 5 days. 6/21/24 6/26/24  Samantha A Meeson, APRN-CNP   chlorhexidine (Peridex) 0.12 % solution Swish and spit 15 mL night before surgery and morning of surgery 6/21/24   Samantha A Meeson, APRN-CNP   methocarbamol (Robaxin) 500 mg tablet Take 1 tablet (500 mg) by mouth 3 times a day. 3/26/24 4/5/24  Historical Provider, MD DAVIDSON ROS:   Constitutional:   neg    Neuro/Psych:    numbness (bilateral (L>R))  Eyes:    neg    Ears:    tinnitus  Nose:   neg    Mouth:   neg    Throat:   neg    Neck:   neg    Cardio:   neg    Respiratory:   neg    Endocrine:   neg    GI:   neg    :   neg    Musculoskeletal:   neg    Hematologic:   neg    Skin:  neg        Physical Exam  Vitals reviewed.   Constitutional:       Appearance: Normal appearance. He is morbidly obese.      Comments: Beard and mustache   HENT:      Head: Normocephalic.      Nose: Nose normal.      Mouth/Throat:      Mouth: Mucous membranes are moist.   Eyes:      Conjunctiva/sclera: Conjunctivae normal.   Neck:      Vascular: No carotid bruit.   Cardiovascular:      Rate and Rhythm: Normal rate and regular rhythm.      Pulses: Normal pulses.      Heart sounds: Normal heart sounds.   Pulmonary:      Effort: Pulmonary effort is normal.      Breath sounds: Normal breath sounds.   Abdominal:      Palpations: Abdomen is soft.      Tenderness: There is no abdominal tenderness.   Musculoskeletal:         General: Normal range of motion.      Cervical back: Normal range of motion.      Right lower leg: No edema.      Left lower leg: No edema.   Lymphadenopathy:      Cervical: No cervical adenopathy.   Skin:     General: Skin is warm and dry.      Capillary Refill: Capillary refill takes less than 2 seconds.   Neurological:      General: No focal deficit present.      Mental Status: He is alert and oriented to person, place, and time.   Psychiatric:         Mood and Affect: Mood normal.         Behavior: Behavior normal. Behavior is cooperative.         Thought Content: Thought content normal.         Judgment: Judgment normal.          PAT AIRWAY:   Airway:     Mallampati::  II    TM distance::  >3 FB    Neck ROM::  Full  normal        Visit Vitals  /78   Pulse 88   Temp 36.7 °C (98.1 °F)       DASI Risk Score      Flowsheet Row Most Recent Value   DASI SCORE 24.2   METS Score (Will be calculated only when all the questions are answered) 5.7          Caprini DVT  Assessment      Flowsheet Row Most Recent Value   DVT Score 11   Current Status Major surgery planned, including arthroscopic and laproscopic (1-2 hours)   History Stroke   Age 40-59 years   BMI 41-50 (Morbid obesity)          Modified Frailty Index      Flowsheet Row Most Recent Value   Modified Frailty Index Calculator .2727          CHADS2 Stroke Risk  Current as of 3 hours ago        N/A 3 to 100%: High Risk   2 to < 3%: Medium Risk   0 to < 2%: Low Risk     Last Change: N/A          This score determines the patient's risk of having a stroke if the patient has atrial fibrillation.        This score is not applicable to this patient. Components are not calculated.          Revised Cardiac Risk Index      Flowsheet Row Most Recent Value   Revised Cardiac Risk Calculator 1          Apfel Simplified Score      Flowsheet Row Most Recent Value   Apfel Simplified Score Calculator 2          Risk Analysis Index Results This Encounter    No data found in the last 1 encounters.       Stop Bang Score      Flowsheet Row Most Recent Value   Do you snore loudly? 0   Do you often feel tired or fatigued after your sleep? 0   Has anyone ever observed you stop breathing in your sleep? 0   Do you have or are you being treated for high blood pressure? 1   Recent BMI (Calculated) 44.2   Is BMI greater than 35 kg/m2? 1=Yes   Age older than 50 years old? 1=Yes   Is your neck circumference greater than 17 inches (Male) or 16 inches (Female)? 1   Gender - Male 1=Yes   STOP-BANG Total Score 5              Assessment and Plan:   Neuro:  CVA (intraparenchymal hemorrhage in the right thalamus) in November 2022 secondary to uncontrolled hypertension with no residual deficits.  Patient managed on 81 mg aspirin (awaiting instructions from Dr. Chadwick to see if patient can continue).  CTA neck on 11/24/2022 with no significant stenosis in bilateral internal carotid arteries.  Diabetic peripheral neuropathy managed on gabapentin  (continue).    The patient is at an increased risk for post operative delirium secondary to type and duration of surgery.  Preoperative brain exercise educational handout provided to patient.    The patient is at an increased risk for perioperative stroke secondary to HTN and general anesthesia.     HEENT/Airway:  No diagnosis or significant findings on chart review or clinical presentation and evaluation.     Cardiovascular: Hypertension managed on amlodipine (continue), hydrochlorothiazide (continue) and lisinopril (hold 24 hours).  Initial blood pressure reading today in office was 162/99 with manual repeat of 146/78.  Spoke with patient about discussing blood pressure control with PCP for possible medication adjustments.  EKG in office today normal sinus rhythm. No additional preoperative testing is currently indicated.    METS are 5.7    RCRI  1 which is 6% 30 day risk of MACE (risk for cardiac death, nonfatal myocardial infarction, and nonfactal cardiac arrest    ALANA score which indicates a 0.3% risk of intraoperative or 30-day postoperative MACE      Pulmonary: High risk for sleep apnea.  Sleep medicine referral placed.  Patient to schedule for postoperative evaluation.  Preoperative deep breathing educational handout provided to patient.    ARISCAT:    3  points which is a low (1.6%) risk of in-hospital post-op pulmonary complications     PRODIGY:   13 points which is a intermediate risk of post op opioid induced respiratory depression episodes    STOP BAN  points which is a high risk for moderate to severe EMANI    Renal: No diagnosis or significant findings on chart review or clinical presentation and evaluation, however, the patient is at increased risk of perioperative renal complications secondary to male sex, HTN, and diabetes. Preventative measures include preoperative BP and DM control.     Endocrine: Uncontrolled NIDDM.  Hemoglobin A1c on 2024 9.8%.  (Results scanned into media tab).   Patient managed on glipizide (hold day of surgery), metformin (hold 24 hours) and semaglutide (last dose 06/18/2024).  Patient is managed by primary care and following the resolved on 05/04/2024 semaglutide was added to his regimen.  Discussed uncontrolled diabetes with surgeon to see if appropriate to postpone until optimization can be achieved.  Discussed risk factors with patient for increased infection and complications with surgery.  Gout managed on allopurinol (continue).    Hematologic:   No diagnosis or significant findings on chart review or clinical presentation and evaluation however see below risk screening tool.  Preoperative DVT educational handout provided to patient.    Caprini Score:  11  points which is a highest risk of perioperative VTE    Gastrointestinal: GERD managed with diet modifications.  Morbid obesity actively attempting weight loss.    EAT-10 score of 0 - self-perceived oropharyngeal dysphagia scale (0-40)     Apfel: 2 points 39% risk for post operative N/V    Infectious disease:  No diagnosis or significant findings on chart review or clinical presentation and evaluation.      Musculoskeletal:  osteoarthritis and cervical spondylosis with myelopathy and cord compression managed on as needed pain medication (hold NSAIDs 7 days).  Patient scheduled for cervical spine surgery on 07/02/2024.    Update 08/05/24: patient was seen by PCP, KARL Watts, on 06/26/24 at which time he was placed on metoprolol. He followed up  07/03/24 and BP was improved to 136/86.  He was instructed to follow up in one month and was scheduled for 08/01/24 for repeat BP check and DM check- however he cancelled the appointment.  I discussed with Dr. Chadwick and since patient does have cord compression there is no more time to delay.  As long as patient's blood pressure is controlled on presentation on day of surgery plan is to proceed.    Labs ordered  Results for orders placed or performed in visit on  06/21/24 (from the past 96 hour(s))   Basic Metabolic Panel   Result Value Ref Range    Glucose 186 (H) 74 - 99 mg/dL    Sodium 141 136 - 145 mmol/L    Potassium 3.8 3.5 - 5.3 mmol/L    Chloride 99 98 - 107 mmol/L    Bicarbonate 29 21 - 32 mmol/L    Anion Gap 17 10 - 20 mmol/L    Urea Nitrogen 21 6 - 23 mg/dL    Creatinine 1.10 0.50 - 1.30 mg/dL    eGFR 79 >60 mL/min/1.73m*2    Calcium 9.3 8.6 - 10.6 mg/dL   CBC and Auto Differential   Result Value Ref Range    WBC 10.3 4.4 - 11.3 x10*3/uL    nRBC 0.0 0.0 - 0.0 /100 WBCs    RBC 5.12 4.50 - 5.90 x10*6/uL    Hemoglobin 14.5 13.5 - 17.5 g/dL    Hematocrit 44.2 41.0 - 52.0 %    MCV 86 80 - 100 fL    MCH 28.3 26.0 - 34.0 pg    MCHC 32.8 32.0 - 36.0 g/dL    RDW 13.4 11.5 - 14.5 %    Platelets 225 150 - 450 x10*3/uL    Neutrophils % 76.3 40.0 - 80.0 %    Immature Granulocytes %, Automated 0.6 0.0 - 0.9 %    Lymphocytes % 15.5 13.0 - 44.0 %    Monocytes % 5.2 2.0 - 10.0 %    Eosinophils % 2.2 0.0 - 6.0 %    Basophils % 0.2 0.0 - 2.0 %    Neutrophils Absolute 7.83 (H) 1.20 - 7.70 x10*3/uL    Immature Granulocytes Absolute, Automated 0.06 0.00 - 0.70 x10*3/uL    Lymphocytes Absolute 1.59 1.20 - 4.80 x10*3/uL    Monocytes Absolute 0.53 0.10 - 1.00 x10*3/uL    Eosinophils Absolute 0.23 0.00 - 0.70 x10*3/uL    Basophils Absolute 0.02 0.00 - 0.10 x10*3/uL   Coagulation Screen   Result Value Ref Range    Protime 10.9 9.8 - 12.8 seconds    INR 1.0 0.9 - 1.1    aPTT 33 27 - 38 seconds   Type And Screen   Result Value Ref Range    ABO TYPE AB     Rh TYPE POS     ANTIBODY SCREEN NEG       MSSA/MRSA culture

## 2024-06-21 NOTE — PREPROCEDURE INSTRUCTIONS
Thank you for visiting [facility] ([facility]) today for your pre-procedure evaluation, you were seen by     Samantha Meeson, MSN, NP-C  Adult-Gerontology Nurse Practitioner II  Department of Anesthesiology and Perioperative Medicine  Main phone [phone]  Direct phone [phone]  Fax [phone]     This summary includes instructions and information to aid you during your perioperative period.  Please read carefully. If you have any questions about your visit today, please call the number listed above.  If you become ill or have any changes to your health before your surgery, please contact your primary care provider and alert your surgeon.    Preparing for your Surgery       Exercises  Preoperative Deep Breathing Exercises  Why it is important to do deep breathing exercises before my surgery?  Deep breathing exercises strengthen your breathing muscles.  This helps you to recover after your surgery and decreases the chance of breathing complications.  How are the deep breathing exercises done?  Sit straight with your back supported.  Breathe in deeply and slowly through your nose. Your lower rib cage should expand and your abdomen may move forward.  Hold that breath for 3 to 5 seconds.  Breathe out through pursed lips, slowly and completely.  Rest and repeat 10 times every hour while awake.  Rest longer if you become dizzy or lightheaded.       Incentive Spirometer   You were provided with an incentive spirometer in CPM/PAT, please follow the below instructions.   You were not provided an incentive spirometer in CPM, please disregard the incentive spirometer instructions  What is an incentive spirometer?  An incentive spirometer is a device used before and after surgery to “exercise” your lungs.  It helps you to take deeper breaths to expand your lungs.  Below is an example of a basic incentive spirometer.  The device you receive may differ slightly but they all function the  same.    Why do I need to use an incentive spirometer?  Using your incentive spirometer prepares your lungs for surgery and helps prevent lung problems after surgery.  How do I use my incentive spirometer?  When you're using your incentive spirometer, make sure to breathe through your mouth. If you breathe through your nose, the incentive spirometer won't work properly. You can hold your nose if you have trouble.  If you feel dizzy at any time, stop and rest. Try again at a later time.  Follow the steps below:  Set up your incentive spirometer, expand the flexible tubing and connect to the outlet.  Sit upright in a chair or bed. Hold the incentive spirometer at eye level.   Put the mouthpiece in your mouth and close your lips tightly around it. Slowly breathe out (exhale) completely.  Breathe in (inhale) slowly through your mouth as deeply as you can. As you take a breath, you will see the piston rise inside the large column. While the piston rises, the indicator should move upwards. It should stay in between the 2 arrows (see Figure).  Try to get the piston as high as you can, while keeping the indicator between the arrows.   If the indicator doesn't stay between the arrows, you're breathing either too fast or too slow.  When you get it as high as you can, hold your breath for 10 seconds, or as long as possible. While you're holding your breath, the piston will slowly fall to the base of the spirometer.  Once the piston reaches the bottom of the spirometer, breathe out slowly through your mouth. Rest for a few seconds.  Repeat 10 times. Try to get the piston to the same level with each breath.  Repeat every hour while awake  You can carefully clean the outside of the mouthpiece with an alcohol wipe or soap and water.      Preoperative Brain Exercises    What are brain exercises?  A brain exercise is any activity that engages your thinking (cognitive) skills.    What types of activities are considered brain  exercises?  Jigsaw puzzles, crossword puzzles, word jumble, memory games, word search, and many more.  Many can be found free online or on your phone via a mobile trupti.    Why should I do brain exercises before my surgery?  More recent research has shown brain exercise before surgery can lower the risk of postoperative delirium (confusion) which can be especially important for older adults.  Patients who did brain exercises for 5 to 10 hours the days before surgery, cut their risk of postoperative delirium in half up to 1 week after surgery.    Sit-to-Stand Exercise    What is the sit-to-stand exercise?  The sit-to-stand exercise strengthens the muscles of your lower body and muscles in the center of your body (core muscles for stability) helping to maintain and improve your strength and mobility.  How do I do the sit-to-stand exercise?  The goal is to do this exercise without using your arms or hands.  If this is too difficult, use your arms and hands or a chair with armrests to help slowly push yourself to the standing position and lower yourself back to the sitting position. As the movement becomes easier use your arms and hands less.    Steps to the sit-to-stand exercise  Sit up tall in a sturdy chair, knees bent, feet flat on the floor shoulder-width apart.  Shift your hips/pelvis forward in the chair to correctly position yourself for the next movement.  Lean forward at your hips.  Stand up straight putting equal weight on both feet.  Check to be sure you are properly aligned with the chair, in a slow controlled movement sit back down.  Repeat this exercise 10-15 times.  If needed you can do it fewer times until your strength improves.  Rest for 1 minute.  Do another 10-15 sit-to-stand exercises.  Try to do this in the morning and evening.        Instructions    Preoperative Fasting Guidelines    Why must I stop eating and drinking near surgery time?  With sedation, food or liquid in your stomach can enter your  lungs causing serious complications  Food can increase nausea and vomiting  When do I need to stop eating and drinking before my surgery?      Do not eat any food after midnight the night before your surgery/procedure. You may have up to 13.5 ounces of clear liquid until TWO hours before your instructed arrival time to the hospital.  This includes water, black tea/coffee, (no milk or cream) apple juice, and electrolyte drinks (Gatorade). You may chew gum until TWO hours before your surgery/procedure            Simple things you can do to help prevent blood clots     Blood clots are blockages that can form in the body's veins. When a blood clot forms in your deep veins, it may be called a deep vein thrombosis, or DVT for short. Blood clots can happen in any part of the body where blood flows, but they are most common in the arms and legs. If a piece of a blood clot breaks free and travels to the lungs, it is called a pulmonary embolus (PE). A PE can be a very serious problem.         Being in the hospital or having surgery can raise your chances of getting a blood clot because you may not be well enough to move around as much as you normally do.         Ways you can help prevent blood clots in the hospital       Wearing SCDs  SCDs stands for Sequential Compression Devices.   SCDs are special sleeves that wrap around your legs. They attach to a pump that fills them with air to gently squeeze your legs every few minutes.  This helps return the blood in your legs to your heart.   SCDs should only be taken off when walking or bathing. SCDs may not be comfortable, but they can help save your life.              Pump SCD leg sleeves  Wearing compression stockings - if your doctor orders them. These special snug-fitting stockings gently squeeze your legs to help blood flow.       Walking. Walking helps move the blood in your legs.   If your doctor says it is ok, try walking the halls at least   5 times a day. Ask us to help  you get up, so you don't fall.      Taking any blood-thinning medicines your doctor orders.              Ways you can help prevent blood clots at home         Wearing compression stockings - if your doctor orders them.   Walking - to help move the blood in your legs.    Taking any blood-thinning medicines your doctor orders.      Signs of a blood clot or PE    Tell your doctor or nurse right away if you have any of the problems listed below.         If you are at home, seek medical care right away. Call 911 for chest pain or problems breathing.            Signs of a blood clot (DVT) - such as pain, swelling, redness, or warmth in your arm or legs.  Signs of a pulmonary embolism (PE) - such as chest pain or feeling short of breath      Tobacco and Alcohol;  Do not drink alcohol or smoke within 24 hours of surgery.  It is best to quit smoking for as long as possible before any surgery or procedure.      The Week before Surgery        Seven days before Surgery  Check your [facility] medication instructions  Do the exercises provided to you by CPM   Arrange for a responsible, adult licensed  to take you home after surgery and stay with you for 24 hours.  You will not be permitted to drive yourself home if you have received any anesthetic/sedation  Six days before surgery  Check your [facility] medication instructions  Do the exercises provided to you by CPM   Start using Chlorhexidene (CHG) body wash if prescribed  Five days before surgery  Check your [facility] medication instructions  Do the exercises provided to you by CPM   Continue to use CHG body wash if prescribed  Three days before surgery  Check your [facility] medication instructions  Do the exercises provided to you by CPM   Continue to use CHG body wash if prescribed  Two days before surgery  Check your [facility] medication instructions  Do the exercises provided to you by CPM   Continue to use CHG body wash if prescribed    The Day before Surgery       Check your [facility] medication and  all other CPM instructions including when to stop eating and drinking  You will be called with your arrival time for surgery in the late afternoon.  If you do not receive a call please reach out to your surgeon's office.  Do not smoke or drink 24 hours before surgery  Prepare items to bring with you to the hospital  Shower with your chlorhexidine wash if prescribed  Brush your teeth and use your chlorhexidine dental rinse if prescribed    The Day of Surgery       Check your [facility] medication instructions  Ensure you follow the instructions for when to stop eating and drinking  Shower, if prescribed use CHG.  Do not apply any lotions, creams, moisturizers, perfume or deodorant  Brush your teeth and use your CHG dental rinse if prescribed  Wear loose comfortable clothing  Avoid make-up  Remove  jewelry and piercings, consider professional piercing removal with a plastic spacer if needed  Bring photo ID and Insurance card  Bring an accurate medication list that includes medication dose, frequency and allergies  Bring a copy of your advanced directives (will, health care power of )  Bring any devices and controllers as well as medical devices you have been provided with for surgery (CPAP, slings, braces, etc.)  Dentures, eyeglasses, and contacts will be removed before surgery, please bring cases for contacts or glasses

## 2024-06-23 LAB — STAPHYLOCOCCUS SPEC CULT: ABNORMAL

## 2024-06-25 PROCEDURE — 93005 ELECTROCARDIOGRAM TRACING: CPT

## 2024-06-26 ENCOUNTER — DOCUMENTATION (OUTPATIENT)
Dept: PRIMARY CARE | Facility: CLINIC | Age: 54
End: 2024-06-26

## 2024-06-26 ENCOUNTER — OFFICE VISIT (OUTPATIENT)
Dept: PRIMARY CARE | Facility: CLINIC | Age: 54
End: 2024-06-26
Payer: COMMERCIAL

## 2024-06-26 VITALS
HEART RATE: 93 BPM | BODY MASS INDEX: 43.4 KG/M2 | OXYGEN SATURATION: 96 % | SYSTOLIC BLOOD PRESSURE: 160 MMHG | TEMPERATURE: 97.8 F | WEIGHT: 315 LBS | DIASTOLIC BLOOD PRESSURE: 82 MMHG

## 2024-06-26 DIAGNOSIS — I10 PRIMARY HYPERTENSION: Primary | ICD-10-CM

## 2024-06-26 PROBLEM — M75.42 IMPINGEMENT SYNDROME OF LEFT SHOULDER: Status: RESOLVED | Noted: 2021-05-23 | Resolved: 2024-06-26

## 2024-06-26 PROBLEM — I21.4 NSTEMI (NON-ST ELEVATED MYOCARDIAL INFARCTION) (MULTI): Status: RESOLVED | Noted: 2022-11-22 | Resolved: 2024-06-26

## 2024-06-26 PROBLEM — S46.212A RUPTURE OF LEFT PROXIMAL BICEPS TENDON: Status: RESOLVED | Noted: 2021-05-20 | Resolved: 2024-06-26

## 2024-06-26 PROBLEM — E11.9 DIABETES MELLITUS (MULTI): Status: RESOLVED | Noted: 2019-11-29 | Resolved: 2024-06-26

## 2024-06-26 PROBLEM — I62.9 INTRACRANIAL HEMORRHAGE (MULTI): Status: RESOLVED | Noted: 2023-01-26 | Resolved: 2024-06-26

## 2024-06-26 PROCEDURE — 3008F BODY MASS INDEX DOCD: CPT

## 2024-06-26 PROCEDURE — 4010F ACE/ARB THERAPY RXD/TAKEN: CPT

## 2024-06-26 PROCEDURE — 3077F SYST BP >= 140 MM HG: CPT

## 2024-06-26 PROCEDURE — 3079F DIAST BP 80-89 MM HG: CPT

## 2024-06-26 PROCEDURE — 99214 OFFICE O/P EST MOD 30 MIN: CPT

## 2024-06-26 RX ORDER — METOPROLOL SUCCINATE 25 MG/1
25 TABLET, EXTENDED RELEASE ORAL DAILY
Qty: 30 TABLET | Refills: 0 | Status: SHIPPED | OUTPATIENT
Start: 2024-06-26 | End: 2024-07-26

## 2024-06-26 RX ORDER — DEXTROMETHORPHAN HYDROBROMIDE, GUAIFENESIN 5; 100 MG/5ML; MG/5ML
1950 LIQUID ORAL EVERY 8 HOURS PRN
COMMUNITY

## 2024-06-26 RX ORDER — METHOCARBAMOL 500 MG/1
500 TABLET, FILM COATED ORAL 3 TIMES DAILY
COMMUNITY
Start: 2024-06-11 | End: 2024-06-21 | Stop reason: WASHOUT

## 2024-06-26 ASSESSMENT — PATIENT HEALTH QUESTIONNAIRE - PHQ9
2. FEELING DOWN, DEPRESSED OR HOPELESS: NOT AT ALL
1. LITTLE INTEREST OR PLEASURE IN DOING THINGS: NOT AT ALL
SUM OF ALL RESPONSES TO PHQ9 QUESTIONS 1 AND 2: 0

## 2024-06-26 ASSESSMENT — PAIN SCALES - GENERAL: PAINLEVEL: 7

## 2024-06-26 NOTE — PROGRESS NOTES
Subjective   Patient ID: Juan Amin is a 53 y.o. male who presents for Diabetes and Hypertension (Spinal surgery for next week was canceled, patient was instructed to get BP under control and they will re-evaluate ).    HPI   Juan presents for concerns of continued uncontrolled hypertension.  He is to under go surgery for C4-C7 anterior cervical discectomy and fusion but his BP was too high at his pre-admission testing and his surgery has been pushed back to 8/6/2024.  He is currently taking hydrochlorothiazide 25 mg, lisinopril 40 mg, and amlodipine 10 mg daily. BP today 160/82.  Reports his home BP last week was 165/90.   Denies change in vision, headache, or dizziness.   No complaints of chest pain, palpitations, or shortness of breath.      Review of Systems  All other systems have been reviewed and are negative except as noted in the HPI.       Objective   /82 (BP Location: Left arm, Patient Position: Sitting)   Pulse 93   Temp 36.6 °C (97.8 °F) (Temporal)   Wt (!) 153 kg (338 lb)   SpO2 96%   BMI 43.40 kg/m²     Physical Exam  Vitals and nursing note reviewed.   Constitutional:       General: He is not in acute distress.     Appearance: Normal appearance. He is morbidly obese. He is not ill-appearing.   Neck:      Vascular: No carotid bruit.   Cardiovascular:      Rate and Rhythm: Normal rate and regular rhythm.      Pulses: Normal pulses.      Heart sounds: Normal heart sounds, S1 normal and S2 normal. No murmur heard.  Pulmonary:      Effort: Pulmonary effort is normal. No respiratory distress.      Breath sounds: Normal breath sounds and air entry.   Musculoskeletal:      Cervical back: Normal range of motion and neck supple. No rigidity or tenderness.      Right lower leg: No edema.      Left lower leg: No edema.   Lymphadenopathy:      Cervical: No cervical adenopathy.   Skin:     General: Skin is warm and dry.      Capillary Refill: Capillary refill takes less than 2 seconds.    Neurological:      General: No focal deficit present.      Mental Status: He is alert. Mental status is at baseline.   Psychiatric:         Mood and Affect: Mood normal.         Behavior: Behavior normal. Behavior is cooperative.         Thought Content: Thought content normal.         Judgment: Judgment normal.         Assessment/Plan   Problem List Items Addressed This Visit    None  Visit Diagnoses         Codes    Primary hypertension    -  Primary  Chronic condition, needs improvement at this visit.  Begin metoprolol XL 25 mg po daily at this visit.  Continue hydrochlorothiazide 25 mg, lisinopril 40 mg, and amlodipine 10 mg daily    Continue to monitor home Bps as discussed.  Non pharmacological interventions such as lowering salt, saturated fats, cholesterol, and sugar diet discussed.  Increase physical activity, aim for 30 minutes 5 days per week as able.  Stress reduction interventions discussed.  Discussed signs and symptoms of major cardiovascular event and need to present to the ED.     Follow up in 1 week for blood pressure re-evaluation and medication adjustment, will goal to achieve stable BP for upcoming surgery on 8/6/2024.   I10    Relevant Medications    metoprolol succinate XL (Toprol-XL) 25 mg 24 hr tablet

## 2024-06-27 ENCOUNTER — APPOINTMENT (OUTPATIENT)
Dept: PRIMARY CARE | Facility: CLINIC | Age: 54
End: 2024-06-27
Payer: COMMERCIAL

## 2024-06-29 LAB
ATRIAL RATE: 87 BPM
P AXIS: 19 DEGREES
P OFFSET: 184 MS
P ONSET: 122 MS
PR INTERVAL: 176 MS
Q ONSET: 210 MS
QRS COUNT: 15 BEATS
QRS DURATION: 94 MS
QT INTERVAL: 374 MS
QTC CALCULATION(BAZETT): 450 MS
QTC FREDERICIA: 423 MS
R AXIS: -33 DEGREES
T AXIS: 84 DEGREES
T OFFSET: 397 MS
VENTRICULAR RATE: 87 BPM

## 2024-07-01 NOTE — CPM/PAT H&P
CPM/PAT Evaluation       Name: Juan Amin (Juan Amin)  /Age: 1970/53 y.o.     Patient with uncontrolled HTN, DM. Surgeon notified. PCP seen 24 for BP. Started on Metoprolol BP at this time 160/82. Plan to continue to monitor, surgery rescheduled for 24.

## 2024-07-03 ENCOUNTER — APPOINTMENT (OUTPATIENT)
Dept: PRIMARY CARE | Facility: CLINIC | Age: 54
End: 2024-07-03
Payer: COMMERCIAL

## 2024-07-03 ENCOUNTER — TELEPHONE (OUTPATIENT)
Dept: PRIMARY CARE | Facility: CLINIC | Age: 54
End: 2024-07-03

## 2024-07-03 VITALS
OXYGEN SATURATION: 98 % | RESPIRATION RATE: 18 BRPM | SYSTOLIC BLOOD PRESSURE: 136 MMHG | DIASTOLIC BLOOD PRESSURE: 86 MMHG | HEART RATE: 80 BPM | WEIGHT: 315 LBS | BODY MASS INDEX: 42.37 KG/M2

## 2024-07-03 DIAGNOSIS — I10 PRIMARY HYPERTENSION: Primary | ICD-10-CM

## 2024-07-03 DIAGNOSIS — E11.9 TYPE 2 DIABETES MELLITUS WITHOUT COMPLICATION, WITHOUT LONG-TERM CURRENT USE OF INSULIN (MULTI): ICD-10-CM

## 2024-07-03 DIAGNOSIS — I10 PRIMARY HYPERTENSION: ICD-10-CM

## 2024-07-03 PROCEDURE — 3008F BODY MASS INDEX DOCD: CPT

## 2024-07-03 PROCEDURE — 3079F DIAST BP 80-89 MM HG: CPT

## 2024-07-03 PROCEDURE — 99214 OFFICE O/P EST MOD 30 MIN: CPT

## 2024-07-03 PROCEDURE — 1036F TOBACCO NON-USER: CPT

## 2024-07-03 PROCEDURE — 3075F SYST BP GE 130 - 139MM HG: CPT

## 2024-07-03 PROCEDURE — 4010F ACE/ARB THERAPY RXD/TAKEN: CPT

## 2024-07-03 RX ORDER — METOPROLOL SUCCINATE 25 MG/1
25 TABLET, EXTENDED RELEASE ORAL DAILY
Qty: 30 TABLET | Refills: 0 | Status: SHIPPED | OUTPATIENT
Start: 2024-07-03 | End: 2024-08-02

## 2024-07-03 ASSESSMENT — PAIN SCALES - GENERAL: PAINLEVEL: 0-NO PAIN

## 2024-07-03 ASSESSMENT — ENCOUNTER SYMPTOMS
PALPITATIONS: 0
COLOR CHANGE: 0
DIARRHEA: 0
ARTHRALGIAS: 0
LOSS OF SENSATION IN FEET: 0
UNEXPECTED WEIGHT CHANGE: 0
TREMORS: 0
FATIGUE: 0
VOMITING: 0
DIZZINESS: 0
NAUSEA: 0
HEMATURIA: 0
OCCASIONAL FEELINGS OF UNSTEADINESS: 0
HEADACHES: 0
DEPRESSION: 0
WEAKNESS: 0
LIGHT-HEADEDNESS: 0
DIFFICULTY URINATING: 0
APPETITE CHANGE: 0
MYALGIAS: 0
SHORTNESS OF BREATH: 0
CHEST TIGHTNESS: 0
ACTIVITY CHANGE: 0
CONSTIPATION: 0

## 2024-07-03 NOTE — PROGRESS NOTES
Subjective   Patient ID: Juan Amin is a 53 y.o. male who presents for Hypertension (Patient is here for a HTN check).    HPI   Juan presents for hypertension follow up.  Seen last week for consistently elevated BP of 160s/90s which caused a postpone in his upcoming cervical spine surgery.   He is currently taking hydrochlorothiazide 25 mg, lisinopril 40 mg, and amlodipine 10 mg daily.  We added metoprolol XL 25 mg daily last week, BP today is 136/86.    Denies change in vision, headache, or dizziness.   No complaints of chest pain, palpitations, or shortness of breath.     Home BPs for 6/27-7/2  146/94, 157/94, 154/99, 151/92, 140/88, 143/90    Review of Systems   Constitutional:  Negative for activity change, appetite change, fatigue and unexpected weight change.   HENT:  Negative for hearing loss.    Eyes:  Negative for visual disturbance.   Respiratory:  Negative for chest tightness and shortness of breath.    Cardiovascular:  Negative for chest pain and palpitations.   Gastrointestinal:  Negative for constipation, diarrhea, nausea and vomiting.   Genitourinary:  Negative for difficulty urinating, hematuria and urgency.   Musculoskeletal:  Negative for arthralgias and myalgias.   Skin:  Negative for color change.   Neurological:  Negative for dizziness, tremors, weakness, light-headedness and headaches.         Objective   /86 (BP Location: Left arm, Patient Position: Sitting, BP Cuff Size: Large adult)   Pulse 80   Resp 18   Wt 150 kg (330 lb)   SpO2 98%   BMI 42.37 kg/m²     Physical Exam  Vitals and nursing note reviewed.   Constitutional:       General: He is not in acute distress.     Appearance: Normal appearance. He is not ill-appearing.   Neck:      Vascular: No carotid bruit.   Cardiovascular:      Rate and Rhythm: Normal rate and regular rhythm.      Pulses: Normal pulses.      Heart sounds: Normal heart sounds, S1 normal and S2 normal. No murmur heard.  Pulmonary:      Effort:  Pulmonary effort is normal. No respiratory distress.      Breath sounds: Normal breath sounds and air entry.   Musculoskeletal:      Cervical back: Normal range of motion and neck supple. No rigidity or tenderness.      Right lower leg: No edema.      Left lower leg: No edema.   Lymphadenopathy:      Cervical: No cervical adenopathy.   Skin:     General: Skin is warm and dry.      Capillary Refill: Capillary refill takes less than 2 seconds.   Neurological:      General: No focal deficit present.      Mental Status: He is alert. Mental status is at baseline.   Psychiatric:         Mood and Affect: Mood normal.         Behavior: Behavior normal. Behavior is cooperative.         Thought Content: Thought content normal.         Judgment: Judgment normal.         Assessment/Plan   Problem List Items Addressed This Visit             ICD-10-CM    Type 2 diabetes mellitus without complication, without long-term current use of insulin (Multi)  Chronic condition, stable today  Tolerating Ozempic 0.5 mg weekly as discussed at last visit.  Refilled prescription today.  Follow up in 1 month for repeat A1c and dose adjustment, will plan to increase to 1 mg subcutaneous weekly as long as tolerated.  E11.9    Relevant Medications    semaglutide 0.25 mg or 0.5 mg (2 mg/3 mL) pen injector (Start on 7/7/2024)     Other Visit Diagnoses         Codes    Primary hypertension    -  Primary  Chronic condition, much improved today   Continue metoprolol XL 25 mg daily, hydrochlorothiazide 25 mg daily, amlodipine 10 mg daily, lisinopril 40 mg daily as prescribed.  Discussed diet interventions to improve BP and weight.   Follow up in 1 month for DM check and BP recheck.  I10    Relevant Medications    metoprolol succinate XL (Toprol-XL) 25 mg 24 hr tablet

## 2024-07-17 LAB — HEMOGLOBIN A1C/HEMOGLOBIN TOTAL IN BLOOD EXTERNAL: 9.8 %

## 2024-07-30 ENCOUNTER — APPOINTMENT (OUTPATIENT)
Dept: SLEEP MEDICINE | Facility: CLINIC | Age: 54
End: 2024-07-30
Payer: COMMERCIAL

## 2024-08-01 ENCOUNTER — APPOINTMENT (OUTPATIENT)
Dept: PRIMARY CARE | Facility: CLINIC | Age: 54
End: 2024-08-01
Payer: COMMERCIAL

## 2024-08-05 ENCOUNTER — ANESTHESIA EVENT (OUTPATIENT)
Dept: OPERATING ROOM | Facility: HOSPITAL | Age: 54
End: 2024-08-05

## 2024-08-06 ENCOUNTER — APPOINTMENT (OUTPATIENT)
Dept: PRIMARY CARE | Facility: CLINIC | Age: 54
End: 2024-08-06
Payer: COMMERCIAL

## 2024-08-06 ENCOUNTER — HOSPITAL ENCOUNTER (OUTPATIENT)
Facility: HOSPITAL | Age: 54
Setting detail: OUTPATIENT SURGERY
Discharge: HOME | End: 2024-08-06
Attending: ORTHOPAEDIC SURGERY | Admitting: ORTHOPAEDIC SURGERY
Payer: COMMERCIAL

## 2024-08-06 ENCOUNTER — ANESTHESIA (OUTPATIENT)
Dept: OPERATING ROOM | Facility: HOSPITAL | Age: 54
End: 2024-08-06

## 2024-08-06 VITALS
OXYGEN SATURATION: 99 % | SYSTOLIC BLOOD PRESSURE: 189 MMHG | HEART RATE: 82 BPM | TEMPERATURE: 97.2 F | RESPIRATION RATE: 16 BRPM | DIASTOLIC BLOOD PRESSURE: 83 MMHG

## 2024-08-06 LAB
ABO GROUP (TYPE) IN BLOOD: NORMAL
GLUCOSE BLD MANUAL STRIP-MCNC: 246 MG/DL (ref 74–99)
RH FACTOR (ANTIGEN D): NORMAL

## 2024-08-06 PROCEDURE — 36415 COLL VENOUS BLD VENIPUNCTURE: CPT | Performed by: STUDENT IN AN ORGANIZED HEALTH CARE EDUCATION/TRAINING PROGRAM

## 2024-08-06 PROCEDURE — 82947 ASSAY GLUCOSE BLOOD QUANT: CPT

## 2024-08-06 ASSESSMENT — PAIN SCALES - GENERAL: PAINLEVEL_OUTOF10: 0 - NO PAIN

## 2024-08-06 ASSESSMENT — COLUMBIA-SUICIDE SEVERITY RATING SCALE - C-SSRS
2. HAVE YOU ACTUALLY HAD ANY THOUGHTS OF KILLING YOURSELF?: NO
6. HAVE YOU EVER DONE ANYTHING, STARTED TO DO ANYTHING, OR PREPARED TO DO ANYTHING TO END YOUR LIFE?: NO
1. IN THE PAST MONTH, HAVE YOU WISHED YOU WERE DEAD OR WISHED YOU COULD GO TO SLEEP AND NOT WAKE UP?: NO

## 2024-08-06 ASSESSMENT — PAIN - FUNCTIONAL ASSESSMENT: PAIN_FUNCTIONAL_ASSESSMENT: 0-10

## 2024-08-06 NOTE — PROGRESS NOTES
Patient presented for surgery today, was already delayed once for uncontrolled HTN. BP in preop 189/90, per anesthesia still high risk. I agree with delaying surgery until BP is under better control. Will d/w patient's PCP regarding aggressive treatment.

## 2024-08-06 NOTE — H&P
[facility] Department of Orthopaedic Surgery   Surgical History & Physical >30 Days  8/6/2024    Reason for Surgery: cervical spinal stenosis  Planned Procedure: C4-7 ACDF    History & Physical Reviewed:  I have reviewed the History and Physical for obtained 6/21/2024.  Relevant findings and updates are noted below:  No significant changes.    Home medications were reviewed with significant updates noted below:  No significant changes.    Allergies:  No Known Allergies    Review of Systems:  Review of Systems   Gen: Denies recent weight loss  Neuro: Denies recent confusion  Ophtho: Denies changes in vision  ENT: Denies changes in hearing  Endo: Denies weight loss/weight gain  CV: Denies chest pain  Resp: Denies shortness of breath  GI: Denies melena/hematochezia  : Denies painful urination  MSK: Per above HPI  Heme: No abnormal bleeding  Psych: Denies hallucinations    Physical Exam:   GEN - NAD, resting comfortably in hospital bed  HEENT - MMM, EOMI, NCAT  CV - RRR by peripheral palpation, limbs wwp  PULM - NWOB on RA  NEURO - CRUM spontaneously, CNs II - XII grossly intact  PSYCH - Appropriate mood and affect  MSK: moves all extremities    ERAS patient?: No    COVID-19 Risk Consent:   Surgeon has reviewed the key risks related to omar COVID-19 and subsequent sequelae.       08/06/24 at 5:46 AM - Rosa Mcacll MD

## 2024-08-07 ENCOUNTER — PREP FOR PROCEDURE (OUTPATIENT)
Dept: ORTHOPEDIC SURGERY | Facility: CLINIC | Age: 54
End: 2024-08-07
Payer: COMMERCIAL

## 2024-08-07 DIAGNOSIS — G95.20 CORD COMPRESSION (MULTI): ICD-10-CM

## 2024-08-07 DIAGNOSIS — M47.12 CERVICAL SPONDYLOSIS WITH MYELOPATHY: Primary | ICD-10-CM

## 2024-08-15 ENCOUNTER — APPOINTMENT (OUTPATIENT)
Dept: PRIMARY CARE | Facility: CLINIC | Age: 54
End: 2024-08-15
Payer: COMMERCIAL

## 2024-08-22 ENCOUNTER — TELEPHONE (OUTPATIENT)
Dept: PRIMARY CARE | Facility: CLINIC | Age: 54
End: 2024-08-22

## 2024-08-22 ENCOUNTER — APPOINTMENT (OUTPATIENT)
Dept: PRIMARY CARE | Facility: CLINIC | Age: 54
End: 2024-08-22
Payer: COMMERCIAL

## 2024-08-22 VITALS
HEART RATE: 84 BPM | OXYGEN SATURATION: 98 % | BODY MASS INDEX: 43.65 KG/M2 | SYSTOLIC BLOOD PRESSURE: 148 MMHG | WEIGHT: 315 LBS | DIASTOLIC BLOOD PRESSURE: 76 MMHG

## 2024-08-22 DIAGNOSIS — I10 PRIMARY HYPERTENSION: Primary | ICD-10-CM

## 2024-08-22 DIAGNOSIS — I10 PRIMARY HYPERTENSION: ICD-10-CM

## 2024-08-22 DIAGNOSIS — E11.9 TYPE 2 DIABETES MELLITUS WITHOUT COMPLICATION, WITHOUT LONG-TERM CURRENT USE OF INSULIN (MULTI): ICD-10-CM

## 2024-08-22 DIAGNOSIS — F50.81 BINGE EATING DISORDER: ICD-10-CM

## 2024-08-22 LAB — POC HEMOGLOBIN A1C: 10.4 % (ref 4.2–6.5)

## 2024-08-22 PROCEDURE — 4010F ACE/ARB THERAPY RXD/TAKEN: CPT

## 2024-08-22 PROCEDURE — 83036 HEMOGLOBIN GLYCOSYLATED A1C: CPT

## 2024-08-22 PROCEDURE — 3077F SYST BP >= 140 MM HG: CPT

## 2024-08-22 PROCEDURE — 99214 OFFICE O/P EST MOD 30 MIN: CPT

## 2024-08-22 PROCEDURE — 3046F HEMOGLOBIN A1C LEVEL >9.0%: CPT

## 2024-08-22 PROCEDURE — 3078F DIAST BP <80 MM HG: CPT

## 2024-08-22 RX ORDER — ROSUVASTATIN CALCIUM 5 MG/1
5 TABLET, COATED ORAL DAILY
Qty: 90 TABLET | Refills: 1 | Status: SHIPPED | OUTPATIENT
Start: 2024-08-22 | End: 2025-02-18

## 2024-08-22 RX ORDER — METOPROLOL SUCCINATE 50 MG/1
50 TABLET, EXTENDED RELEASE ORAL DAILY
Qty: 30 TABLET | Refills: 11 | Status: SHIPPED | OUTPATIENT
Start: 2024-08-22 | End: 2025-08-22

## 2024-08-22 RX ORDER — GLIPIZIDE 10 MG/1
10 TABLET ORAL
Qty: 60 TABLET | Refills: 11 | Status: SHIPPED | OUTPATIENT
Start: 2024-08-22 | End: 2025-08-22

## 2024-08-22 ASSESSMENT — PAIN SCALES - GENERAL: PAINLEVEL: 0-NO PAIN

## 2024-08-22 NOTE — ASSESSMENT & PLAN NOTE
Chronic condition, not well controlled  - increase glipizide to 10 mg BID  -Increase Ozempic to 1 mg subcutaneous weekly  -add Crestor for cardiac protection  - Monitor glucose levels with CGM as discussed.   -Discussed limiting sweets, excess carbs from diet.    -Try to eat 5-6 smaller, balanced meals per day as discussed.   -Follow up in 1 month  Orders:    POCT glycosylated hemoglobin (Hb A1C) manually resulted    glipiZIDE (Glucotrol) 10 mg tablet; Take 1 tablet (10 mg) by mouth 2 times a day before meals.    rosuvastatin (Crestor) 5 mg tablet; Take 1 tablet (5 mg) by mouth once daily.    semaglutide (OZEMPIC) 1 mg/dose (4 mg/3 mL) pen injector; Inject 1 mg under the skin 1 (one) time per week.

## 2024-08-22 NOTE — PROGRESS NOTES
"Subjective     Patient ID: Juan Amin \"Gagan\" is a 53 y.o. male who presents for Hypertension.      HPI  Juan presents for follow up of essential hypertension.   Taking hydrochlorothiazide 25 mg, amlodipine 10 mg daily, lisinopril 40 mg daily, and Toprol XL 25 mg daily   Tolerating medications well.  BP still remains elevated, he was scheduled for cervical spine surgery which has needed to be rescheduled twice due to his elevated BP.   Denies change in vision, headache, or dizziness.   No complaints of chest pain, palpitations, or shortness of breath.    He also presents for diabetes follow-up:    Current treatment: Metformin 1000 mg BID, glipizide 5 mg BID, Ozempic 0.5 mg weekly-  has had to hold a few dose of Ozempic due to surgical procedure that keeps being rescheduled due to his BP.  He never goes longer than 14 days without an inject but is likely effecting his A1C.  A1c has increased form 9.8% to 10.4% over the last 3 months.   Current symptoms: hyperglycemia, paresthesia of the feet, polydipsia, and polyuria and have been unchanged.   Glucose is monitored: 0 times daily and the average reading is: not checking  Eye exam current (within one year): no  Diet: not following a low carb diet. Exercise: never  Current Statin treatment: Yes- ordered today  Previous A1c; 9.8%  has increased to 10.4%  The last A1C was:   Lab Results   Component Value Date    HGBA1C 10.4 (A) 08/22/2024          Patient's recent visit notes, medication and allergy lists, past medical surgical social hx, immunization, vitals, problem list, recent tests were reviewed by me for pertinence to this visit.    Current Outpatient Medications:     acetaminophen (Tylenol 8 HOUR) 650 mg ER tablet, Take 3 tablets (1,950 mg) by mouth every 8 hours if needed for mild pain (1 - 3). Do not crush, chew, or split., Disp: , Rfl:     allopurinol (Zyloprim) 300 mg tablet, Take 1 tablet (300 mg) by mouth once daily. as directed, Disp: 90 tablet, Rfl: " 1    amLODIPine (Norvasc) 10 mg tablet, Take 1 tablet (10 mg) by mouth once daily., Disp: 90 tablet, Rfl: 1    ascorbic acid (Vitamin C) 1,000 mg tablet, Take 1 tablet (1,000 mg) by mouth once daily., Disp: , Rfl:     aspirin 81 mg EC tablet, Take 1 tablet (81 mg) by mouth once daily., Disp: , Rfl:     chlorhexidine (Peridex) 0.12 % solution, Swish and spit 15 mL night before surgery and morning of surgery, Disp: 473 mL, Rfl: 0    gabapentin (Neurontin) 300 mg capsule, Take 1 capsule (300 mg) by mouth 3 times a day., Disp: , Rfl:     hydroCHLOROthiazide (HYDRODiuril) 25 mg tablet, Take 1 tablet (25 mg) by mouth once daily., Disp: 90 tablet, Rfl: 1    lisinopril 40 mg tablet, Take 1 tablet (40 mg) by mouth once daily., Disp: 90 tablet, Rfl: 1    metFORMIN (Glucophage) 500 mg tablet, Take 2 tablets (1,000 mg) by mouth 2 times daily (morning and late afternoon)., Disp: 360 tablet, Rfl: 1    multivitamin tablet, Take 1 tablet by mouth once daily., Disp: , Rfl:     glipiZIDE (Glucotrol) 10 mg tablet, Take 1 tablet (10 mg) by mouth 2 times a day before meals., Disp: 60 tablet, Rfl: 11    metoprolol succinate XL (Toprol-XL) 25 mg 24 hr tablet, Take 1 tablet (25 mg) by mouth once daily. Do not crush or chew., Disp: 30 tablet, Rfl: 11      Review of Systems  All other systems have been reviewed and are negative except as noted in the HPI.         Objective   /76 (BP Location: Left arm)   Pulse 84   Wt (!) 154 kg (340 lb)   SpO2 98%   BMI 43.65 kg/m²       Physical Exam  Physical Exam  Vitals and nursing note reviewed.   Constitutional:       General: He is not in acute distress.     Appearance: Normal appearance. He is not ill-appearing. Morbid obesity.  Neck:      Vascular: No carotid bruit.   Cardiovascular:      Rate and Rhythm: Normal rate and regular rhythm.      Pulses: Normal pulses.      Heart sounds: Normal heart sounds, S1 normal and S2 normal. No murmur heard.  Pulmonary:      Effort: Pulmonary effort is  normal. No respiratory distress.      Breath sounds: Normal breath sounds and air entry.   Musculoskeletal:      Cervical back: Normal range of motion and neck supple. No rigidity or tenderness.      Right lower leg: No edema.      Left lower leg: No edema.   Lymphadenopathy:      Cervical: No cervical adenopathy.   Skin:     General: Skin is warm and dry.      Capillary Refill: Capillary refill takes less than 2 seconds.   Neurological:      General: No focal deficit present.      Mental Status: He is alert. Mental status is at baseline.   Psychiatric:         Mood and Affect: Mood normal.         Behavior: Behavior normal. Behavior is cooperative.         Thought Content: Thought content normal.         Judgment: Judgment normal.          Assessment & Plan  Primary hypertension  Chronic condition, needs improvement at this visit.  Increase Metoprolol succinate XL 50 mg daily  Continue hydrochlorothiazide 25 mg daily, amlodipine 10 mg daily, lisinopril 40 mg daily.  Call if blood pressure consistently >140/90.  Non pharmacological interventions such as lowering salt, saturated fats, cholesterol, and sugar diet discussed.  Increase physical activity, aim for 30 minutes 5 days per week as able.  Stress reduction interventions discussed.  Discussed signs and symptoms of major cardiovascular event and need to present to the ED.     Follow up in 1 month for blood pressure re-evaluation.     Orders:    metoprolol succinate XL (Toprol-XL) 50 mg 24 hr tablet; Take 1 tablet (50 mg) by mouth once daily. Do not crush or chew.    Referral to Cardiology; Future    Type 2 diabetes mellitus without complication, without long-term current use of insulin (Multi)  Chronic condition, not well controlled  - increase glipizide to 10 mg BID  -Increase Ozempic to 1 mg subcutaneous weekly  -add Crestor for cardiac protection  - Monitor glucose levels with CGM as discussed.   -Discussed limiting sweets, excess carbs from diet.    -Try to  eat 5-6 smaller, balanced meals per day as discussed.   -Follow up in 1 month  Orders:    POCT glycosylated hemoglobin (Hb A1C) manually resulted    glipiZIDE (Glucotrol) 10 mg tablet; Take 1 tablet (10 mg) by mouth 2 times a day before meals.    rosuvastatin (Crestor) 5 mg tablet; Take 1 tablet (5 mg) by mouth once daily.    semaglutide (OZEMPIC) 1 mg/dose (4 mg/3 mL) pen injector; Inject 1 mg under the skin 1 (one) time per week.    Binge eating disorder  Referral to psychology as there is concerns for binge eating disorder based on patients description of his eating patterns and difficulty controlling his cravings and his binging despite medications that reduce hunger.   Orders:    Referral to Psychology; Future          Patient understands and agrees with treatment plan.    Courtney Watts, KARL-CNP

## 2024-08-27 ENCOUNTER — APPOINTMENT (OUTPATIENT)
Dept: CARDIOLOGY | Facility: HOSPITAL | Age: 54
End: 2024-08-27
Payer: COMMERCIAL

## 2024-08-27 ENCOUNTER — APPOINTMENT (OUTPATIENT)
Dept: RADIOLOGY | Facility: HOSPITAL | Age: 54
End: 2024-08-27
Payer: COMMERCIAL

## 2024-08-27 ENCOUNTER — HOSPITAL ENCOUNTER (EMERGENCY)
Facility: HOSPITAL | Age: 54
Discharge: HOME | End: 2024-08-27
Attending: EMERGENCY MEDICINE
Payer: COMMERCIAL

## 2024-08-27 VITALS
SYSTOLIC BLOOD PRESSURE: 159 MMHG | BODY MASS INDEX: 40.43 KG/M2 | OXYGEN SATURATION: 97 % | RESPIRATION RATE: 21 BRPM | HEIGHT: 74 IN | DIASTOLIC BLOOD PRESSURE: 91 MMHG | TEMPERATURE: 97.7 F | WEIGHT: 315 LBS | HEART RATE: 68 BPM

## 2024-08-27 DIAGNOSIS — R51.9 NONINTRACTABLE HEADACHE, UNSPECIFIED CHRONICITY PATTERN, UNSPECIFIED HEADACHE TYPE: Primary | ICD-10-CM

## 2024-08-27 LAB
ALBUMIN SERPL-MCNC: 4 G/DL (ref 3.5–5)
ALP BLD-CCNC: 55 U/L (ref 35–125)
ALT SERPL-CCNC: 31 U/L (ref 5–40)
ANION GAP SERPL CALC-SCNC: 11 MMOL/L
AST SERPL-CCNC: 30 U/L (ref 5–40)
BASOPHILS # BLD AUTO: 0.02 X10*3/UL (ref 0–0.1)
BASOPHILS NFR BLD AUTO: 0.2 %
BILIRUB SERPL-MCNC: 0.4 MG/DL (ref 0.1–1.2)
BUN SERPL-MCNC: 19 MG/DL (ref 8–25)
CALCIUM SERPL-MCNC: 9.2 MG/DL (ref 8.5–10.4)
CHLORIDE SERPL-SCNC: 96 MMOL/L (ref 97–107)
CO2 SERPL-SCNC: 25 MMOL/L (ref 24–31)
CREAT SERPL-MCNC: 1 MG/DL (ref 0.4–1.6)
EGFRCR SERPLBLD CKD-EPI 2021: 89 ML/MIN/1.73M*2
EOSINOPHIL # BLD AUTO: 0.15 X10*3/UL (ref 0–0.7)
EOSINOPHIL NFR BLD AUTO: 1.4 %
ERYTHROCYTE [DISTWIDTH] IN BLOOD BY AUTOMATED COUNT: 13.5 % (ref 11.5–14.5)
GLUCOSE SERPL-MCNC: 181 MG/DL (ref 65–99)
HCT VFR BLD AUTO: 40.7 % (ref 41–52)
HGB BLD-MCNC: 14.2 G/DL (ref 13.5–17.5)
IMM GRANULOCYTES # BLD AUTO: 0.04 X10*3/UL (ref 0–0.7)
IMM GRANULOCYTES NFR BLD AUTO: 0.4 % (ref 0–0.9)
LYMPHOCYTES # BLD AUTO: 1.74 X10*3/UL (ref 1.2–4.8)
LYMPHOCYTES NFR BLD AUTO: 16 %
MCH RBC QN AUTO: 28.8 PG (ref 26–34)
MCHC RBC AUTO-ENTMCNC: 34.9 G/DL (ref 32–36)
MCV RBC AUTO: 83 FL (ref 80–100)
MONOCYTES # BLD AUTO: 0.52 X10*3/UL (ref 0.1–1)
MONOCYTES NFR BLD AUTO: 4.8 %
NEUTROPHILS # BLD AUTO: 8.39 X10*3/UL (ref 1.2–7.7)
NEUTROPHILS NFR BLD AUTO: 77.2 %
NRBC BLD-RTO: 0 /100 WBCS (ref 0–0)
PLATELET # BLD AUTO: 191 X10*3/UL (ref 150–450)
POTASSIUM SERPL-SCNC: 3.4 MMOL/L (ref 3.4–5.1)
PROT SERPL-MCNC: 7 G/DL (ref 5.9–7.9)
RBC # BLD AUTO: 4.93 X10*6/UL (ref 4.5–5.9)
SODIUM SERPL-SCNC: 132 MMOL/L (ref 133–145)
TROPONIN T SERPL-MCNC: 23 NG/L
TROPONIN T SERPL-MCNC: 25 NG/L
WBC # BLD AUTO: 10.9 X10*3/UL (ref 4.4–11.3)

## 2024-08-27 PROCEDURE — 2500000004 HC RX 250 GENERAL PHARMACY W/ HCPCS (ALT 636 FOR OP/ED): Performed by: EMERGENCY MEDICINE

## 2024-08-27 PROCEDURE — 84484 ASSAY OF TROPONIN QUANT: CPT | Performed by: EMERGENCY MEDICINE

## 2024-08-27 PROCEDURE — 70496 CT ANGIOGRAPHY HEAD: CPT | Performed by: STUDENT IN AN ORGANIZED HEALTH CARE EDUCATION/TRAINING PROGRAM

## 2024-08-27 PROCEDURE — 93005 ELECTROCARDIOGRAM TRACING: CPT

## 2024-08-27 PROCEDURE — 36415 COLL VENOUS BLD VENIPUNCTURE: CPT | Performed by: EMERGENCY MEDICINE

## 2024-08-27 PROCEDURE — 96375 TX/PRO/DX INJ NEW DRUG ADDON: CPT | Mod: 59

## 2024-08-27 PROCEDURE — 2500000005 HC RX 250 GENERAL PHARMACY W/O HCPCS: Performed by: EMERGENCY MEDICINE

## 2024-08-27 PROCEDURE — 85025 COMPLETE CBC W/AUTO DIFF WBC: CPT | Performed by: EMERGENCY MEDICINE

## 2024-08-27 PROCEDURE — 99285 EMERGENCY DEPT VISIT HI MDM: CPT | Mod: 25

## 2024-08-27 PROCEDURE — 96374 THER/PROPH/DIAG INJ IV PUSH: CPT | Mod: 59

## 2024-08-27 PROCEDURE — 2550000001 HC RX 255 CONTRASTS: Performed by: EMERGENCY MEDICINE

## 2024-08-27 PROCEDURE — 70496 CT ANGIOGRAPHY HEAD: CPT

## 2024-08-27 PROCEDURE — 84075 ASSAY ALKALINE PHOSPHATASE: CPT | Performed by: EMERGENCY MEDICINE

## 2024-08-27 PROCEDURE — 96361 HYDRATE IV INFUSION ADD-ON: CPT

## 2024-08-27 RX ORDER — METOPROLOL TARTRATE 1 MG/ML
5 INJECTION, SOLUTION INTRAVENOUS ONCE
Status: COMPLETED | OUTPATIENT
Start: 2024-08-27 | End: 2024-08-27

## 2024-08-27 RX ORDER — BUTALBITAL, ACETAMINOPHEN AND CAFFEINE 50; 325; 40 MG/1; MG/1; MG/1
1 TABLET ORAL EVERY 6 HOURS PRN
Qty: 12 TABLET | Refills: 0 | Status: SHIPPED | OUTPATIENT
Start: 2024-08-27 | End: 2024-09-01

## 2024-08-27 RX ORDER — KETOROLAC TROMETHAMINE 30 MG/ML
30 INJECTION, SOLUTION INTRAMUSCULAR; INTRAVENOUS ONCE
Status: COMPLETED | OUTPATIENT
Start: 2024-08-27 | End: 2024-08-27

## 2024-08-27 ASSESSMENT — PAIN DESCRIPTION - LOCATION
LOCATION: HEAD
LOCATION: HEAD

## 2024-08-27 ASSESSMENT — PAIN DESCRIPTION - DESCRIPTORS
DESCRIPTORS: HEADACHE
DESCRIPTORS: HEADACHE

## 2024-08-27 ASSESSMENT — COLUMBIA-SUICIDE SEVERITY RATING SCALE - C-SSRS
2. HAVE YOU ACTUALLY HAD ANY THOUGHTS OF KILLING YOURSELF?: NO
1. IN THE PAST MONTH, HAVE YOU WISHED YOU WERE DEAD OR WISHED YOU COULD GO TO SLEEP AND NOT WAKE UP?: NO
6. HAVE YOU EVER DONE ANYTHING, STARTED TO DO ANYTHING, OR PREPARED TO DO ANYTHING TO END YOUR LIFE?: NO

## 2024-08-27 ASSESSMENT — PAIN - FUNCTIONAL ASSESSMENT
PAIN_FUNCTIONAL_ASSESSMENT: 0-10
PAIN_FUNCTIONAL_ASSESSMENT: 0-10

## 2024-08-27 ASSESSMENT — PAIN DESCRIPTION - PAIN TYPE: TYPE: ACUTE PAIN

## 2024-08-27 ASSESSMENT — PAIN SCALES - GENERAL
PAINLEVEL_OUTOF10: 5 - MODERATE PAIN
PAINLEVEL_OUTOF10: 5 - MODERATE PAIN

## 2024-08-28 NOTE — DISCHARGE INSTRUCTIONS
Thank you for choosing [facility] Emergency Department. It was my pleasure to be involved in your care today.         As of today's visit, based on reasonable likelihood, that it is safe for you to be discharged back to your residence to follow-up as an outpatient for ongoing management of your medical problem. You should follow-up with any referrals / primary provider as soon as possible. The contacts (number, addresses) are listed below.         Important:  Even though we think it is safe for you to go home, there is always a small chance that we are missing something that could require hospitalization.  Therefore it is very important that if you get worse or develops any new symptoms that you return here as soon as possible to be re-evaluated.  This includes return of symptoms that have resolved such as fainting, chest pain, or symptoms that could be warning signs for stroke important:  Even though we think it is safe for you to go home, there is always a small chance that we are missing something that could require hospitalization.  Therefore it is very important that if you get worse or develops any new symptoms that you return here as soon as possible to be re-evaluated.  This includes return of symptoms that have resolved such as fainting, chest pain, or symptoms that could be warning signs for stroke         Make sure your pharmacy and primary doctor is aware of any new medications prescribed today.          It is your responsibility to contact as soon as possible, and follow through with, any referrals you were given today. We do recommend you inform them you are a [facility] follow-up patient, as often they can better accommodate your need to be seen, provided their schedules allow. We will, and have, made every effort to ensure you have access to adequate follow-up specialists available.          All problems may not be able to be fixed in one ER visit. This is why timely ongoing care is important, and this  is a responsibility you share in. Further, you are free to follow up with any provider you choose, and this is not limited to our suggestion.          If cultures were obtained today, you will be contacted should anything result that would require further treatment. Please contact the ED at the number provided with questions.          Having trouble affording medications? [email or web address] (This is not a hospital endorsed website, merely a recommendation based on my own personal experiences with [employer])

## 2024-08-28 NOTE — ED PROVIDER NOTES
HPI   Chief Complaint   Patient presents with    Numbness     Pt states he has HTN. Pt work up on Sunday with a headache. He's felt off the last few days. Today had an episode of numbness all over his body. Pt states he feels dizzy. Pt also had recent change in prescription medication dosages. Pt denies weakness other than his usual.  Hx. TIA, HTN, Spinal stenosis       HPI  53-year-old male with a history of a hemorrhagic CVA in 2022 presents with headaches.  Patient's had headaches on and off last few days a seem to come and go.  He has been taking some Excedrin for it.  He also has a history of hypertension and his doctor is adjusted his metoprolol but has noticed his blood pressure is still elevated.  He has history of spinal stenosis and is supposed to have surgery for that but has been postponed due to his hypertension.  He is felt just a little off since his medications have been adjusted.  Today an episode where he felt numbness from his head to his toes that only lasted for a brief second and then went away.  At that point he had a little blurred vision as well.  He denies any focal weakness.  No chest pain or shortness of breath.  No nausea or vomiting.  No history of any recent illness.  No other complaints.      Patient History   Past Medical History:   Diagnosis Date    Allergic     Arthritis     Blindness 1974    Right eye prosthesis    Cervical radicular pain     Cervical spondylosis without myelopathy     GERD (gastroesophageal reflux disease)     Gout     Hypertension     Impingement syndrome of left shoulder 05/23/2021    Last Assessment & Plan:    Formatting of this note might be different from the original.   See above.    Intracranial hemorrhage (Multi) 01/26/2023    Last Assessment & Plan:    Formatting of this note might be different from the original.   Right thalamic intracranial hemorrhage likely hypertensive urgency.      Today his blood pressure was significantly elevated and this was  discussed with him specifically, risk of hemorrhagic stroke is high.  He reports this was a one-time thing as he was ill recently and he checked his blood pressure regular    Neck pain     NSTEMI (non-ST elevated myocardial infarction) (Multi) 2022    Obesity     Rupture of left proximal biceps tendon 2021    Last Assessment & Plan:    Formatting of this note might be different from the original.   My personal reading of MRI of left humerus shows proximal biceps tendon rupture.   PT referral for ROM and strengthening.   Follow up in 6 weeks.    Spinal stenosis 2014    Stroke (Multi) 2022    CT head was obtained which showed small intraparenchymal hemorrhage in the right thalamus    Type 2 diabetes mellitus (Multi)     Uncontrolled A1C 9.8 on - (found results in media tab), follows with PCP who initated Ozempic also on metformin and glipizide     Past Surgical History:   Procedure Laterality Date    EYE SURGERY      OTHER SURGICAL HISTORY      facial reconstruction surgery- as a child    OTHER SURGICAL HISTORY      fracture surgery- broken leg as a child     Family History   Problem Relation Name Age of Onset    Osteoarthritis Mother      Coronary artery disease Father       Social History     Tobacco Use    Smoking status: Former     Types: Cigarettes     Start date: 1993     Quit date: 1982     Years since quittin.6     Passive exposure: Past    Smokeless tobacco: Never   Vaping Use    Vaping status: Never Used   Substance Use Topics    Alcohol use: Yes     Comment: I dont drink weekly    Drug use: Never       Physical Exam   ED Triage Vitals [24 1934]   Temperature Heart Rate Respirations BP   36.5 °C (97.7 °F) 95 (!) 22 (!) 177/94      Pulse Ox Temp Source Heart Rate Source Patient Position   99 % Oral -- Sitting      BP Location FiO2 (%)     Left arm --       Physical Exam  General:  Awake, alert, no acute distress.  Head: Normocephalic, Atraumatic  Eyes: Slight proptosis  right eye  Neck: Supple, trachea midline, no stridor  Skin: Warm and dry, no rashes   Lungs: Clear to auscultation bilaterally no acute respiratory distress, speaking in full sentences without difficulty  CV: Regular Rate Rhythm with no obvious murmurs gallops rubs noted, no jugular venous distention, no pedal edema   Abdomen: Soft, nontender, nondistended, positive bowel sounds, no peritoneal signs  Neuro:  No gross focal neurologic deficits, NIH is 0  Musculoskeletal:  Full range of motion in all 4 extremities  Psychiatric:  Alert oriented x 3, Good insight into condition.    ED Course & MDM   ED Course as of 08/27/24 2246   Tue Aug 27, 2024   2000 My EKG interpretation:  Sinus rhythm 80 bpm normal axis  QTc 463 no ectopy or acute ischemic changes noted [KW]      ED Course User Index  [KW] Jose Manzano DO         Diagnoses as of 08/27/24 2246   Nonintractable headache, unspecified chronicity pattern, unspecified headache type                 No data recorded     Hamilton Coma Scale Score: 15 (08/27/24 1933 : Yamile Colonor, EMT)                           Medical Decision Making  Patient's workup in the ED is unremarkable.  He was treated with 5 mg of Lopressor as he was due for his metoprolol tonight.  He had a mild headache he was treated with 30 mg of Toradol.  His CT is negative.  He has slightly elevated troponins in the 20s but delta is essentially unchanged.  He has no chest pain no EKG changes I did discuss this with him at bedside but I do not feel requires admission for further valuation regarding this.  At this point feel comfortable any emergent etiology of excluded.  I have no concern that patient had a CVA or TIA.  I prescribed him Fioricet for home.  Advised him to contact his primary care doctor for further evaluation regarding his headaches and hypertension.  He is stable for discharge.    Procedure  Procedures     Jose Manzano DO  08/27/24 2247

## 2024-08-29 ENCOUNTER — APPOINTMENT (OUTPATIENT)
Dept: PRIMARY CARE | Facility: CLINIC | Age: 54
End: 2024-08-29
Payer: COMMERCIAL

## 2024-08-29 LAB
ATRIAL RATE: 80 BPM
P AXIS: 84 DEGREES
P OFFSET: 154 MS
P ONSET: 110 MS
PR INTERVAL: 186 MS
Q ONSET: 203 MS
QRS COUNT: 13 BEATS
QRS DURATION: 98 MS
QT INTERVAL: 402 MS
QTC CALCULATION(BAZETT): 463 MS
QTC FREDERICIA: 442 MS
R AXIS: -56 DEGREES
T AXIS: 77 DEGREES
T OFFSET: 404 MS
VENTRICULAR RATE: 80 BPM

## 2024-09-03 ENCOUNTER — APPOINTMENT (OUTPATIENT)
Dept: PRIMARY CARE | Facility: CLINIC | Age: 54
End: 2024-09-03
Payer: COMMERCIAL

## 2024-09-03 VITALS
OXYGEN SATURATION: 97 % | WEIGHT: 315 LBS | DIASTOLIC BLOOD PRESSURE: 80 MMHG | BODY MASS INDEX: 42.5 KG/M2 | SYSTOLIC BLOOD PRESSURE: 150 MMHG | TEMPERATURE: 98 F | HEART RATE: 87 BPM

## 2024-09-03 DIAGNOSIS — I10 PRIMARY HYPERTENSION: ICD-10-CM

## 2024-09-03 DIAGNOSIS — E11.9 TYPE 2 DIABETES MELLITUS WITHOUT COMPLICATION, WITHOUT LONG-TERM CURRENT USE OF INSULIN (MULTI): ICD-10-CM

## 2024-09-03 DIAGNOSIS — R51.9 ACUTE NONINTRACTABLE HEADACHE, UNSPECIFIED HEADACHE TYPE: Primary | ICD-10-CM

## 2024-09-03 PROCEDURE — 99214 OFFICE O/P EST MOD 30 MIN: CPT

## 2024-09-03 PROCEDURE — 3077F SYST BP >= 140 MM HG: CPT

## 2024-09-03 PROCEDURE — 4010F ACE/ARB THERAPY RXD/TAKEN: CPT

## 2024-09-03 PROCEDURE — 1036F TOBACCO NON-USER: CPT

## 2024-09-03 PROCEDURE — 3046F HEMOGLOBIN A1C LEVEL >9.0%: CPT

## 2024-09-03 PROCEDURE — 3079F DIAST BP 80-89 MM HG: CPT

## 2024-09-03 RX ORDER — METOPROLOL SUCCINATE 100 MG/1
100 TABLET, EXTENDED RELEASE ORAL DAILY
Qty: 90 TABLET | Refills: 1 | Status: SHIPPED | OUTPATIENT
Start: 2024-09-03 | End: 2025-03-02

## 2024-09-03 RX ORDER — ROSUVASTATIN CALCIUM 5 MG/1
5 TABLET, COATED ORAL DAILY
Qty: 90 TABLET | Refills: 1 | Status: SHIPPED | OUTPATIENT
Start: 2024-09-03 | End: 2025-03-02

## 2024-09-03 ASSESSMENT — PATIENT HEALTH QUESTIONNAIRE - PHQ9
1. LITTLE INTEREST OR PLEASURE IN DOING THINGS: NOT AT ALL
2. FEELING DOWN, DEPRESSED OR HOPELESS: NOT AT ALL
SUM OF ALL RESPONSES TO PHQ9 QUESTIONS 1 AND 2: 0

## 2024-09-03 ASSESSMENT — PAIN SCALES - GENERAL: PAINLEVEL: 0-NO PAIN

## 2024-09-03 NOTE — PROGRESS NOTES
"Subjective     Patient ID: Juan Amin \"Gagan\" is a 53 y.o. male who presents for ER Follow-up (8/27/24).      YOLANDA Farah presents for ER follow-up.  Was seen in ER 1 week ago today for concerns of left-sided face and arm numbness and what he describes as the worst headache of his life.  He does have a history of stroke and was concerned that he was having another.  ER notes, labs, and imaging reviewed for pertinence to this visit.  CT angio unremarkable.  Labs and EKG did not show any concerns.  He was discharged from the ER with diagnosis of non-intractable headache.  He is supposed to undergo C4-7 anterior cervical discectomy and fusion; however, that surgery has been postponed on 2 occasions due to his chronic hypertension.  He reports the ER doctor thinks possibly his cervical degeneration may have caused some of his symptoms.  His blood pressure continues to be elevated despite recent increase of metoprolol XL.  Checking his blood home blood pressures still ranging in the high 150s to 160s over 80s to 90s.  His headache has resolved, his numbness has also improved since his ER visit.    Patient's recent visit notes, medication and allergy lists, past medical surgical social hx, immunization, vitals, problem list, recent tests were reviewed by me for pertinence to this visit.    Current Outpatient Medications:     allopurinol (Zyloprim) 300 mg tablet, Take 1 tablet (300 mg) by mouth once daily. as directed, Disp: 90 tablet, Rfl: 1    amLODIPine (Norvasc) 10 mg tablet, Take 1 tablet (10 mg) by mouth once daily., Disp: 90 tablet, Rfl: 1    aspirin 81 mg EC tablet, Take 1 tablet (81 mg) by mouth once daily., Disp: , Rfl:     gabapentin (Neurontin) 300 mg capsule, Take 1 capsule (300 mg) by mouth 3 times a day., Disp: , Rfl:     glipiZIDE (Glucotrol) 10 mg tablet, Take 1 tablet (10 mg) by mouth 2 times a day before meals., Disp: 60 tablet, Rfl: 11    hydroCHLOROthiazide (HYDRODiuril) 25 mg tablet, Take 1 tablet " (25 mg) by mouth once daily., Disp: 90 tablet, Rfl: 1    lisinopril 40 mg tablet, Take 1 tablet (40 mg) by mouth once daily., Disp: 90 tablet, Rfl: 1    metFORMIN (Glucophage) 500 mg tablet, Take 2 tablets (1,000 mg) by mouth 2 times daily (morning and late afternoon)., Disp: 360 tablet, Rfl: 1    metoprolol succinate XL (Toprol-XL) 50 mg 24 hr tablet, Take 1 tablet (50 mg) by mouth once daily. Do not crush or chew., Disp: 30 tablet, Rfl: 11    semaglutide (OZEMPIC) 1 mg/dose (4 mg/3 mL) pen injector, Inject 1 mg under the skin 1 (one) time per week., Disp: 3 mL, Rfl: 0    acetaminophen (Tylenol 8 HOUR) 650 mg ER tablet, Take 3 tablets (1,950 mg) by mouth every 8 hours if needed for mild pain (1 - 3). Do not crush, chew, or split., Disp: , Rfl:     ascorbic acid (Vitamin C) 1,000 mg tablet, Take 1 tablet (1,000 mg) by mouth once daily., Disp: , Rfl:     chlorhexidine (Peridex) 0.12 % solution, Swish and spit 15 mL night before surgery and morning of surgery, Disp: 473 mL, Rfl: 0    multivitamin tablet, Take 1 tablet by mouth once daily., Disp: , Rfl:     rosuvastatin (Crestor) 5 mg tablet, Take 1 tablet (5 mg) by mouth once daily., Disp: 90 tablet, Rfl: 1      Review of Systems  All other systems have been reviewed and are negative except as noted in the HPI.         Objective   /80 (BP Location: Left arm)   Pulse 87   Temp 36.7 °C (98 °F) (Temporal)   Wt (!) 150 kg (331 lb)   SpO2 97%   BMI 42.50 kg/m²       Physical Exam  Vitals and nursing note reviewed.   Constitutional:       General: He is not in acute distress.     Appearance: Normal appearance. He is not ill-appearing.   Eyes:      Comments: Right eye glass eye   Neck:      Vascular: No carotid bruit.   Cardiovascular:      Rate and Rhythm: Normal rate and regular rhythm.      Pulses: Normal pulses.      Heart sounds: Normal heart sounds, S1 normal and S2 normal. No murmur heard.  Pulmonary:      Effort: Pulmonary effort is normal. No respiratory  distress.      Breath sounds: Normal breath sounds and air entry.   Musculoskeletal:      Cervical back: Normal range of motion and neck supple. No rigidity or tenderness.      Right lower leg: No edema.      Left lower leg: No edema.   Lymphadenopathy:      Cervical: No cervical adenopathy.   Skin:     General: Skin is warm and dry.      Capillary Refill: Capillary refill takes less than 2 seconds.   Neurological:      General: No focal deficit present.      Mental Status: He is alert. Mental status is at baseline.      Cranial Nerves: No cranial nerve deficit.   Psychiatric:         Mood and Affect: Mood normal.         Behavior: Behavior normal. Behavior is cooperative.         Thought Content: Thought content normal.         Judgment: Judgment normal.             Assessment & Plan  Acute nonintractable headache, unspecified headache type  Acute problem, resolved  ER notes, imaging, labs reviewed for pertinence for this visit.  Discussed interventions to reduce headaches  Suspect headache may also be a complication of his chronic cervical spine stenosis.  He is scheduled for surgery 1 week from today.       Primary hypertension  Ongoing problem, needs tighter control  Home BPs remain elevated.  BP today 150/80  Increase metoprolol to succinate XL to 100 mg by mouth to be taken before bed.  Continue with monitoring home BPs.  Follow-up September 26 as scheduled.  Orders:    metoprolol succinate XL (Toprol-XL) 100 mg 24 hr tablet; Take 1 tablet (100 mg) by mouth once daily. Do not crush or chew.          Patient understands and agrees with treatment plan.    KARL Javed-CNP

## 2024-09-03 NOTE — ASSESSMENT & PLAN NOTE
Acute problem, resolved  ER notes, imaging, labs reviewed for pertinence for this visit.  Discussed interventions to reduce headaches  Suspect headache may also be a complication of his chronic cervical spine stenosis.  He is scheduled for surgery 1 week from today.

## 2024-09-09 ENCOUNTER — ANESTHESIA EVENT (OUTPATIENT)
Dept: OPERATING ROOM | Facility: HOSPITAL | Age: 54
End: 2024-09-09

## 2024-09-10 ENCOUNTER — ANESTHESIA (OUTPATIENT)
Dept: OPERATING ROOM | Facility: HOSPITAL | Age: 54
End: 2024-09-10

## 2024-09-10 ENCOUNTER — HOSPITAL ENCOUNTER (OUTPATIENT)
Facility: HOSPITAL | Age: 54
Setting detail: OUTPATIENT SURGERY
Discharge: HOME | End: 2024-09-10
Attending: ORTHOPAEDIC SURGERY | Admitting: ORTHOPAEDIC SURGERY
Payer: COMMERCIAL

## 2024-09-10 VITALS
DIASTOLIC BLOOD PRESSURE: 94 MMHG | WEIGHT: 315 LBS | TEMPERATURE: 97 F | HEART RATE: 78 BPM | HEIGHT: 73 IN | SYSTOLIC BLOOD PRESSURE: 181 MMHG | RESPIRATION RATE: 17 BRPM | OXYGEN SATURATION: 97 % | BODY MASS INDEX: 41.75 KG/M2

## 2024-09-10 LAB — GLUCOSE BLD MANUAL STRIP-MCNC: 168 MG/DL (ref 74–99)

## 2024-09-10 PROCEDURE — 2720000007 HC OR 272 NO HCPCS: Performed by: ORTHOPAEDIC SURGERY

## 2024-09-10 PROCEDURE — 82947 ASSAY GLUCOSE BLOOD QUANT: CPT

## 2024-09-10 ASSESSMENT — COLUMBIA-SUICIDE SEVERITY RATING SCALE - C-SSRS
1. IN THE PAST MONTH, HAVE YOU WISHED YOU WERE DEAD OR WISHED YOU COULD GO TO SLEEP AND NOT WAKE UP?: NO
2. HAVE YOU ACTUALLY HAD ANY THOUGHTS OF KILLING YOURSELF?: NO
6. HAVE YOU EVER DONE ANYTHING, STARTED TO DO ANYTHING, OR PREPARED TO DO ANYTHING TO END YOUR LIFE?: NO

## 2024-09-10 ASSESSMENT — PAIN - FUNCTIONAL ASSESSMENT: PAIN_FUNCTIONAL_ASSESSMENT: 0-10

## 2024-09-10 ASSESSMENT — PAIN SCALES - GENERAL: PAINLEVEL_OUTOF10: 5 - MODERATE PAIN

## 2024-09-10 NOTE — ANESTHESIA PREPROCEDURE EVALUATION
"Patient: Juan Amin \"Bill\"    Procedure Information       Date/Time: 09/10/24 1300    Procedure: C4-7 anterior cervical discectomy and fusion (Spine Cervical)    Location: [facility] OR  / [facility] OR    Surgeons: Benjie Chadwick MD            Relevant Problems   Cardiac   (+) Essential hypertension   (+) High cholesterol      Neuro   (+) Carpal tunnel syndrome   (+) Radiculitis, cervical      Endocrine   (+) Morbid obesity (Multi)   (+) Type 2 diabetes mellitus without complication, without long-term current use of insulin (Multi)      Hematology   (+) Anemia      Musculoskeletal   (+) Carpal tunnel syndrome   (+) Cervical spondylosis with myelopathy   (+) Degenerative disc disease, cervical   (+) Spinal stenosis, cervical region       Clinical information reviewed:   Tobacco  Allergies  Meds   Med Hx  Surg Hx   Fam Hx  Soc Hx        NPO Detail:  NPO/Void Status  Carbohydrate Drink Given Prior to Surgery? : N  Date of Last Liquid: 09/09/24  Time of Last Liquid: 1700  Date of Last Solid: 09/09/24  Time of Last Solid: 1700  Last Intake Type: Clear fluids  Time of Last Void: 0730         PHYSICAL EXAM    Anesthesia Plan    Patient presented today for ACDF. Preoperatively, BP found to be elevated to 181/94. Surgeon, Dr. Chadwick, decided to cancel case and postpone due elevated BP today. Patient in agreement and himself expressed apprehension with proceeding today due to his elevated BP. Case was previously cancelled in August for same issue/preop hypertension - at that time, patient was instructed to see PCP for medication management and possible cardiology referral. Though patient did follow up with PCP, he has not yet seen cardiology. He was instructed today to pursue follow-up with cardiology regarding elevated BP in spite of uptitration of meds. Patient expressed understanding and agreement, all questions answered.  "

## 2024-09-18 DIAGNOSIS — E11.9 TYPE 2 DIABETES MELLITUS WITHOUT COMPLICATION, WITHOUT LONG-TERM CURRENT USE OF INSULIN (MULTI): ICD-10-CM

## 2024-09-18 RX ORDER — SEMAGLUTIDE 1.34 MG/ML
1 INJECTION, SOLUTION SUBCUTANEOUS
Qty: 3 ML | Refills: 1 | Status: SHIPPED | OUTPATIENT
Start: 2024-09-22

## 2024-09-26 ENCOUNTER — OFFICE VISIT (OUTPATIENT)
Dept: CARDIOLOGY | Facility: CLINIC | Age: 54
End: 2024-09-26
Payer: COMMERCIAL

## 2024-09-26 ENCOUNTER — APPOINTMENT (OUTPATIENT)
Dept: PRIMARY CARE | Facility: CLINIC | Age: 54
End: 2024-09-26
Payer: COMMERCIAL

## 2024-09-26 VITALS
WEIGHT: 315 LBS | SYSTOLIC BLOOD PRESSURE: 158 MMHG | HEART RATE: 81 BPM | DIASTOLIC BLOOD PRESSURE: 80 MMHG | OXYGEN SATURATION: 98 % | BODY MASS INDEX: 44.8 KG/M2

## 2024-09-26 DIAGNOSIS — E78.00 HIGH CHOLESTEROL: ICD-10-CM

## 2024-09-26 DIAGNOSIS — I10 ESSENTIAL HYPERTENSION: Primary | ICD-10-CM

## 2024-09-26 DIAGNOSIS — Z01.810 PREOP CARDIOVASCULAR EXAM: ICD-10-CM

## 2024-09-26 PROCEDURE — 99204 OFFICE O/P NEW MOD 45 MIN: CPT | Performed by: INTERNAL MEDICINE

## 2024-09-26 PROCEDURE — 3077F SYST BP >= 140 MM HG: CPT | Performed by: INTERNAL MEDICINE

## 2024-09-26 PROCEDURE — 4010F ACE/ARB THERAPY RXD/TAKEN: CPT | Performed by: INTERNAL MEDICINE

## 2024-09-26 PROCEDURE — 3079F DIAST BP 80-89 MM HG: CPT | Performed by: INTERNAL MEDICINE

## 2024-09-26 PROCEDURE — 99214 OFFICE O/P EST MOD 30 MIN: CPT | Performed by: INTERNAL MEDICINE

## 2024-09-26 PROCEDURE — 1036F TOBACCO NON-USER: CPT | Performed by: INTERNAL MEDICINE

## 2024-09-26 PROCEDURE — 3046F HEMOGLOBIN A1C LEVEL >9.0%: CPT | Performed by: INTERNAL MEDICINE

## 2024-09-26 RX ORDER — HYDRALAZINE HYDROCHLORIDE 25 MG/1
25 TABLET, FILM COATED ORAL 3 TIMES DAILY
Qty: 90 TABLET | Refills: 11 | Status: SHIPPED | OUTPATIENT
Start: 2024-09-26 | End: 2025-09-26

## 2024-09-26 ASSESSMENT — ENCOUNTER SYMPTOMS
LOSS OF SENSATION IN FEET: 0
WHEEZING: 0
DYSPNEA ON EXERTION: 0
DEPRESSION: 0
COUGH: 0
WEIGHT LOSS: 0
DIAPHORESIS: 0
WEIGHT GAIN: 0
PND: 0
CLAUDICATION: 0
PALPITATIONS: 0
WEAKNESS: 0
ORTHOPNEA: 0
DIZZINESS: 0
SYNCOPE: 0
FEVER: 0
IRREGULAR HEARTBEAT: 0
OCCASIONAL FEELINGS OF UNSTEADINESS: 1
SHORTNESS OF BREATH: 0
MYALGIAS: 0
NEAR-SYNCOPE: 0

## 2024-09-26 ASSESSMENT — PAIN SCALES - GENERAL: PAINLEVEL: 5

## 2024-09-26 NOTE — ASSESSMENT & PLAN NOTE
The patient is of low risk for a surgical procedure that carries an inherent moderate risk of adverse cardiac events. In the absence of acute coronary syndrome, acutely decompensated heart failure, hemodynamically significant valvular disease, or malignant arrhythmia I would consider this risk acceptable to proceed without further cardiac workup or intervention.

## 2024-09-26 NOTE — ASSESSMENT & PLAN NOTE
Uncontrolled on 3 medications. Hesitant to increase hydrochlorothiazide especially around the time of a surgery as his serum sodium is low end of normal. I am going to start hydralazine 25mg TID for now. Would proceed w surgery. Follow up with me in 2m

## 2024-09-26 NOTE — PROGRESS NOTES
Subjective      Chief Complaint   Patient presents with    [facility]     Hypertension        54-year-old male presents for cardiac evaluation.  He has a history of hypertension, and was scheduled to have C4-7 discectomy with fusion earlier this month however blood pressures were uncontrolled.  Surgery was canceled and he was given a referral to cardiology.     He has no chest pain or dyspnea. He is functionally limited by his C-spine and shoulder arthritis.          Review of Systems   Constitutional: Negative for diaphoresis, fever, weight gain and weight loss.   Eyes:  Negative for visual disturbance.   Cardiovascular:  Negative for chest pain, claudication, dyspnea on exertion, irregular heartbeat, leg swelling, near-syncope, orthopnea, palpitations, paroxysmal nocturnal dyspnea and syncope.   Respiratory:  Negative for cough, shortness of breath and wheezing.    Musculoskeletal:  Negative for muscle weakness and myalgias.   Neurological:  Negative for dizziness and weakness.   All other systems reviewed and are negative.       Past Medical History:   Diagnosis Date    Allergic     Arthritis     Blindness 1974    Right eye prosthesis    Cervical radicular pain     Cervical spondylosis without myelopathy     GERD (gastroesophageal reflux disease)     Gout     Hyperlipidemia     Hypertension     Impingement syndrome of left shoulder 05/23/2021    Last Assessment & Plan:    Formatting of this note might be different from the original.   See above.    Intracranial hemorrhage (Multi) 01/26/2023    Last Assessment & Plan:    Formatting of this note might be different from the original.   Right thalamic intracranial hemorrhage likely hypertensive urgency.      Today his blood pressure was significantly elevated and this was discussed with him specifically, risk of hemorrhagic stroke is high.  He reports this was a one-time thing as he was ill recently and he checked his blood pressure regular    Neck pain      NSTEMI (non-ST elevated myocardial infarction) (Multi) 2022    Obesity     Rupture of left proximal biceps tendon 2021    Last Assessment & Plan:    Formatting of this note might be different from the original.   My personal reading of MRI of left humerus shows proximal biceps tendon rupture.   PT referral for ROM and strengthening.   Follow up in 6 weeks.    Spinal stenosis 2014    Stroke (Multi) 2022    CT head was obtained which showed small intraparenchymal hemorrhage in the right thalamus    Type 2 diabetes mellitus (Multi)     Uncontrolled A1C 9.8 on - (found results in media tab), follows with PCP who initated Ozempic also on metformin and glipizide        Past Surgical History:   Procedure Laterality Date    EYE SURGERY      OTHER SURGICAL HISTORY      facial reconstruction surgery- as a child    OTHER SURGICAL HISTORY      fracture surgery- broken leg as a child        Social History     Socioeconomic History    Marital status:      Spouse name: Not on file    Number of children: Not on file    Years of education: Not on file    Highest education level: Not on file   Occupational History    Not on file   Tobacco Use    Smoking status: Former     Average packs/day: 1 pack/day for 10.0 years (10.0 ttl pk-yrs)     Types: Cigarettes     Start date: 1993     Quit date: 1982     Years since quittin.7     Passive exposure: Past    Smokeless tobacco: Never   Vaping Use    Vaping status: Never Used   Substance and Sexual Activity    Alcohol use: Yes     Comment: I dont drink weekly    Drug use: Never    Sexual activity: Defer     Partners: Female     Birth control/protection: None     Comment: Sex is with my wife   Other Topics Concern    Not on file   Social History Narrative    Not on file     Social Determinants of Health     Financial Resource Strain: Low Risk  (2022)    [facility]     Overall Financial Resource Strain (CARDIA)     Difficulty of Paying Living Expenses: Not hard at all   Food Insecurity: No Food Insecurity (12/21/2023)    [facility]    Hunger Vital Sign     Worried About Running Out of Food in the Last Year: Never true     Ran Out of Food in the Last Year: Never true   Transportation Needs: No Transportation Needs (12/21/2023)    [facility]      In the past 12 months, has lack of reliable transportation kept you from medical appointments, meetings, work or from getting things needed for daily living?: Not on file   Physical Activity: Patient Declined (5/19/2023)    [facility]    Exercise Vital Sign     Days of Exercise per Week: Patient declined     Minutes of Exercise per Session: Patient declined   Stress: No Stress Concern Present (4/6/2022)    [facility]    [employer] [place] [employer] - Occupational Stress Questionnaire     Feeling of Stress : Not at all   Social Connections: Unknown (5/19/2023)    [facility]    Social Connection and Isolation Panel [NHANES]     Frequency of Communication with Friends and Family: Patient declined     Frequency of Social Gatherings with Friends and Family: Patient declined     Attends Religion Services: Patient declined     Active Member of Clubs or Organizations: Not on file     Attends Club or Organization Meetings: Not on file     Marital Status: Not on file   Intimate Partner Violence: Not At Risk (12/21/2023)    Received from [facility], [facility]  "[facility]    PRAPARE - Interpersonal Safety     Do you feel physically and emotionally safe? (A safe environment is one in which a person is not insulted, talked down to, threatened with harm, or screamed or cursed at.): Not on file   Housing Stability: Low Risk  (12/21/2023)    [facility]    AAFP SN - Housing Stability     Are you worried or concerned that in the next two months you may not have stable housing that you own, rent, or stay in as a part of a household?: Not on file     Think about the place you live. Do you have problems with any of the following? (check all that apply): None of the above        Family History   Problem Relation Name Age of Onset    Osteoarthritis Mother      Coronary artery disease Father Shubham     Heart attack Father Shubham         OBJECTIVE:    Vitals:    09/26/24 1506   BP: 158/80   Pulse: 81   SpO2: 98%        Vitals reviewed.   Constitutional:       Appearance: Normal and healthy appearance. Not in distress.   Pulmonary:      Effort: Pulmonary effort is normal.      Breath sounds: Normal breath sounds.   Cardiovascular:      Normal rate. Regular rhythm. Normal S1. Normal S2.       Murmurs: There is no murmur.      No gallop.  No click.   Pulses:     Intact distal pulses.   Edema:     Peripheral edema absent.   Skin:     General: Skin is warm and dry.   Neurological:      General: No focal deficit present.          Lab Review:   Lab Results   Component Value Date     (L) 08/27/2024    K 3.4 08/27/2024    CL 96 (L) 08/27/2024    CO2 25 08/27/2024    BUN 19 08/27/2024    CREATININE 1.00 08/27/2024    GLUCOSE 181 (H) 08/27/2024    CALCIUM 9.2 08/27/2024     No results found for: \"CHOL\", \"TRIG\", \"HDL\", \"LDLDIRECT\"    No results found for: \"LDLCALC\"     High cholesterol  Lipids managed by his PCP    Preop cardiovascular exam  The patient is of low risk for a surgical " procedure that carries an inherent moderate risk of adverse cardiac events. In the absence of acute coronary syndrome, acutely decompensated heart failure, hemodynamically significant valvular disease, or malignant arrhythmia I would consider this risk acceptable to proceed without further cardiac workup or intervention.      Essential hypertension  Uncontrolled on 3 medications. Hesitant to increase hydrochlorothiazide especially around the time of a surgery as his serum sodium is low end of normal. I am going to start hydralazine 25mg TID for now. Would proceed w surgery. Follow up with me in 2m

## 2024-10-03 ENCOUNTER — APPOINTMENT (OUTPATIENT)
Dept: ORTHOPEDIC SURGERY | Facility: CLINIC | Age: 54
End: 2024-10-03
Payer: COMMERCIAL

## 2024-10-22 ENCOUNTER — APPOINTMENT (OUTPATIENT)
Dept: PRIMARY CARE | Facility: CLINIC | Age: 54
End: 2024-10-22
Payer: COMMERCIAL

## 2024-11-07 ENCOUNTER — APPOINTMENT (OUTPATIENT)
Dept: PRIMARY CARE | Facility: CLINIC | Age: 54
End: 2024-11-07
Payer: COMMERCIAL

## 2024-11-12 DIAGNOSIS — E11.9 TYPE 2 DIABETES MELLITUS WITHOUT COMPLICATION, WITHOUT LONG-TERM CURRENT USE OF INSULIN (MULTI): ICD-10-CM

## 2024-11-12 RX ORDER — SEMAGLUTIDE 1.34 MG/ML
1 INJECTION, SOLUTION SUBCUTANEOUS
Qty: 3 ML | Refills: 0 | Status: SHIPPED | OUTPATIENT
Start: 2024-11-12

## 2024-11-20 ENCOUNTER — HOSPITAL ENCOUNTER (OUTPATIENT)
Facility: HOSPITAL | Age: 54
Setting detail: SURGERY ADMIT
End: 2024-11-20
Attending: ORTHOPAEDIC SURGERY | Admitting: ORTHOPAEDIC SURGERY
Payer: COMMERCIAL

## 2024-11-20 ENCOUNTER — PREP FOR PROCEDURE (OUTPATIENT)
Dept: ORTHOPEDIC SURGERY | Facility: CLINIC | Age: 54
End: 2024-11-20
Payer: COMMERCIAL

## 2024-11-20 DIAGNOSIS — G95.20 CORD COMPRESSION (MULTI): Primary | ICD-10-CM

## 2024-11-20 DIAGNOSIS — M48.02 SPINAL STENOSIS IN CERVICAL REGION: ICD-10-CM

## 2024-11-20 DIAGNOSIS — M47.12 CERVICAL SPONDYLOSIS WITH MYELOPATHY: Primary | ICD-10-CM

## 2024-11-20 DIAGNOSIS — G95.20 SPINAL CORD COMPRESSION (MULTI): ICD-10-CM

## 2024-11-20 RX ORDER — GABAPENTIN 300 MG/1
600 CAPSULE ORAL ONCE
OUTPATIENT
Start: 2024-11-20 | End: 2024-11-20

## 2024-11-20 RX ORDER — CEFAZOLIN SODIUM 2 G/100ML
2 INJECTION, SOLUTION INTRAVENOUS ONCE
OUTPATIENT
Start: 2024-11-20 | End: 2024-11-20

## 2024-11-20 RX ORDER — ACETAMINOPHEN 325 MG/1
975 TABLET ORAL ONCE
OUTPATIENT
Start: 2024-11-20 | End: 2024-11-20

## 2024-11-21 ENCOUNTER — OFFICE VISIT (OUTPATIENT)
Dept: CARDIOLOGY | Facility: CLINIC | Age: 54
End: 2024-11-21
Payer: COMMERCIAL

## 2024-11-21 NOTE — PROGRESS NOTES
Subjective      Chief Complaint   Patient presents with   • 2 month f/u        54-year-old male presents for cardiac evaluation.  He has a history of hypertension, and was scheduled to have C4-7 discectomy with fusion Sept 2024 however blood pressures were uncontrolled.  Surgery was canceled and he was given a referral to cardiology.  I added 3 times daily hydralazine, he is here for follow-up.       ROS     Past Medical History:   Diagnosis Date   • Allergic    • Arthritis    • Blindness 1974    Right eye prosthesis   • Cervical radicular pain    • Cervical spondylosis without myelopathy    • GERD (gastroesophageal reflux disease)    • Gout    • Hyperlipidemia    • Hypertension    • Impingement syndrome of left shoulder 05/23/2021    Last Assessment & Plan:    Formatting of this note might be different from the original.   See above.   • Intracranial hemorrhage (Multi) 01/26/2023    Last Assessment & Plan:    Formatting of this note might be different from the original.   Right thalamic intracranial hemorrhage likely hypertensive urgency.      Today his blood pressure was significantly elevated and this was discussed with him specifically, risk of hemorrhagic stroke is high.  He reports this was a one-time thing as he was ill recently and he checked his blood pressure regular   • Neck pain    • NSTEMI (non-ST elevated myocardial infarction) (Multi) 11/22/2022   • Obesity    • Rupture of left proximal biceps tendon 05/20/2021    Last Assessment & Plan:    Formatting of this note might be different from the original.   My personal reading of MRI of left humerus shows proximal biceps tendon rupture.   PT referral for ROM and strengthening.   Follow up in 6 weeks.   • Spinal stenosis 2014   • Stroke (Multi) 11/2022    CT head was obtained which showed small intraparenchymal hemorrhage in the right thalamus   • Type 2 diabetes mellitus (Multi)     Uncontrolled A1C 9.8 on 05/24- (found results in media tab), follows with  PCP who initated Ozempic also on metformin and glipizide        Past Surgical History:   Procedure Laterality Date   • EYE SURGERY     • OTHER SURGICAL HISTORY      facial reconstruction surgery- as a child   • OTHER SURGICAL HISTORY      fracture surgery- broken leg as a child        Social History     Socioeconomic History   • Marital status:      Spouse name: Not on file   • Number of children: Not on file   • Years of education: Not on file   • Highest education level: Not on file   Occupational History   • Not on file   Tobacco Use   • Smoking status: Former     Average packs/day: 1 pack/day for 10.0 years (10.0 ttl pk-yrs)     Types: Cigarettes     Start date: 1993     Quit date: 1982     Years since quittin.9     Passive exposure: Past   • Smokeless tobacco: Never   Vaping Use   • Vaping status: Never Used   Substance and Sexual Activity   • Alcohol use: Yes     Comment: I dont drink weekly   • Drug use: Never   • Sexual activity: Defer     Partners: Female     Birth control/protection: None     Comment: Sex is with my wife   Other Topics Concern   • Not on file   Social History Narrative   • Not on file     Social Drivers of Health     Financial Resource Strain: Low Risk  (2022)    [facility]    Overall Financial Resource Strain (CARDIA)    • Difficulty of Paying Living Expenses: Not hard at all   Food Insecurity: No Food Insecurity (2023)    [facility], [facility]    Hunger Vital Sign    • Worried About Running Out of Food in the Last Year: Never true    • Ran Out of Food in the Last Year: Never true   Transportation Needs: No Transportation Needs (2023)    Received from [facility], [facility] HRSN - Transportation    •  In the past 12 months, has  lack of reliable transportation kept you from medical appointments, meetings, work or from getting things needed for daily living?: Not on file   Physical Activity: Patient Declined (5/19/2023)    [facility], [facility]    Exercise Vital Sign    • Days of Exercise per Week: Patient declined    • Minutes of Exercise per Session: Patient declined   Stress: No Stress Concern Present (4/6/2022)    [facility] from [facility], [facility]    [employer] [place] [employer] - Occupational Stress Questionnaire    • Feeling of Stress : Not at all   Social Connections: Unknown (5/19/2023)    Received from [facility], [facility]    Social Connection and Isolation Panel [NHANES]    • Frequency of Communication with Friends and Family: Patient declined    • Frequency of Social Gatherings with Friends and Family: Patient declined    • Attends Jainism Services: Patient declined    • Active Member of Clubs or Organizations: Not on file    • Attends Club or Organization Meetings: Not on file    • Marital Status: Not on file   Intimate Partner Violence: Not At Risk (12/21/2023)    Received from [facility], [facility]    PRAPARE - Interpersonal Safety    • Do you feel physically and emotionally safe? (A safe environment is one in which a person is not insulted, talked down to, threatened with harm, or screamed or cursed at.): Not on file   Housing Stability: Low Risk  (12/21/2023)    Received from [facility], [facility]    AA SN - Housing Stability    • Are you worried or concerned that in the next two months you may not have stable housing that you own, rent, or stay in as a part of a household?: Not on file    •  "Think about the place you live. Do you have problems with any of the following? (check all that apply): None of the above        Family History   Problem Relation Name Age of Onset   • Osteoarthritis Mother     • Coronary artery disease Father Shubham    • Heart attack Father Shubham         OBJECTIVE:    There were no vitals filed for this visit.     Physical Exam     Lab Review:   {Recent labs:19471::\"not applicable\"}    No results found for: \"LDLCALC\"     No problem-specific Assessment & Plan notes found for this encounter.       "

## 2024-11-26 ENCOUNTER — APPOINTMENT (OUTPATIENT)
Dept: PRIMARY CARE | Facility: CLINIC | Age: 54
End: 2024-11-26
Payer: COMMERCIAL

## 2024-12-05 ENCOUNTER — APPOINTMENT (OUTPATIENT)
Dept: PRIMARY CARE | Facility: CLINIC | Age: 54
End: 2024-12-05
Payer: COMMERCIAL

## 2024-12-07 DIAGNOSIS — I10 PRIMARY HYPERTENSION: ICD-10-CM

## 2024-12-08 ASSESSMENT — ENCOUNTER SYMPTOMS: HYPERTENSION: 1

## 2024-12-09 RX ORDER — HYDROCHLOROTHIAZIDE 25 MG/1
25 TABLET ORAL DAILY
Qty: 90 TABLET | Refills: 1 | Status: SHIPPED | OUTPATIENT
Start: 2024-12-09

## 2024-12-09 RX ORDER — LISINOPRIL 40 MG/1
40 TABLET ORAL DAILY
Qty: 90 TABLET | Refills: 1 | Status: SHIPPED | OUTPATIENT
Start: 2024-12-09

## 2024-12-10 ENCOUNTER — APPOINTMENT (OUTPATIENT)
Dept: PRIMARY CARE | Facility: CLINIC | Age: 54
End: 2024-12-10
Payer: COMMERCIAL

## 2024-12-10 ENCOUNTER — TELEPHONE (OUTPATIENT)
Dept: PRIMARY CARE | Facility: CLINIC | Age: 54
End: 2024-12-10

## 2024-12-10 VITALS
DIASTOLIC BLOOD PRESSURE: 84 MMHG | HEART RATE: 84 BPM | SYSTOLIC BLOOD PRESSURE: 150 MMHG | BODY MASS INDEX: 42.66 KG/M2 | WEIGHT: 315 LBS | OXYGEN SATURATION: 96 % | HEIGHT: 72 IN

## 2024-12-10 DIAGNOSIS — I10 PRIMARY HYPERTENSION: ICD-10-CM

## 2024-12-10 DIAGNOSIS — E78.00 HIGH CHOLESTEROL: ICD-10-CM

## 2024-12-10 DIAGNOSIS — E11.9 TYPE 2 DIABETES MELLITUS WITHOUT COMPLICATION, WITHOUT LONG-TERM CURRENT USE OF INSULIN (MULTI): Primary | ICD-10-CM

## 2024-12-10 DIAGNOSIS — E11.9 TYPE 2 DIABETES MELLITUS WITHOUT COMPLICATION, WITHOUT LONG-TERM CURRENT USE OF INSULIN (MULTI): ICD-10-CM

## 2024-12-10 PROCEDURE — 4010F ACE/ARB THERAPY RXD/TAKEN: CPT

## 2024-12-10 PROCEDURE — 3046F HEMOGLOBIN A1C LEVEL >9.0%: CPT

## 2024-12-10 PROCEDURE — 3079F DIAST BP 80-89 MM HG: CPT

## 2024-12-10 PROCEDURE — 99214 OFFICE O/P EST MOD 30 MIN: CPT

## 2024-12-10 PROCEDURE — 3008F BODY MASS INDEX DOCD: CPT

## 2024-12-10 PROCEDURE — 3077F SYST BP >= 140 MM HG: CPT

## 2024-12-10 PROCEDURE — 1036F TOBACCO NON-USER: CPT

## 2024-12-10 RX ORDER — ROSUVASTATIN CALCIUM 5 MG/1
5 TABLET, COATED ORAL DAILY
Qty: 90 TABLET | Refills: 1 | Status: SHIPPED | OUTPATIENT
Start: 2024-12-10 | End: 2025-06-08

## 2024-12-10 RX ORDER — GLIPIZIDE 10 MG/1
10 TABLET ORAL
Qty: 180 TABLET | Refills: 1 | Status: SHIPPED | OUTPATIENT
Start: 2024-12-10 | End: 2025-06-08

## 2024-12-10 RX ORDER — METFORMIN HYDROCHLORIDE 500 MG/1
1000 TABLET ORAL
Qty: 360 TABLET | Refills: 1 | Status: SHIPPED | OUTPATIENT
Start: 2024-12-10

## 2024-12-10 ASSESSMENT — PATIENT HEALTH QUESTIONNAIRE - PHQ9
SUM OF ALL RESPONSES TO PHQ9 QUESTIONS 1 AND 2: 0
1. LITTLE INTEREST OR PLEASURE IN DOING THINGS: NOT AT ALL
2. FEELING DOWN, DEPRESSED OR HOPELESS: NOT AT ALL

## 2024-12-10 ASSESSMENT — PAIN SCALES - GENERAL: PAINLEVEL_OUTOF10: 0-NO PAIN

## 2024-12-10 ASSESSMENT — COLUMBIA-SUICIDE SEVERITY RATING SCALE - C-SSRS: 1. IN THE PAST MONTH, HAVE YOU WISHED YOU WERE DEAD OR WISHED YOU COULD GO TO SLEEP AND NOT WAKE UP?: NO

## 2024-12-10 NOTE — ASSESSMENT & PLAN NOTE
Restart rosuvastatin 5 mg daily  Labs ordered at this visit  Reports any new onset of muscle pain or weakness.  Continue with health diet low in saturated fats, cholesterol, sodium, and limit sugars.  Exercise 30-45 minutes 5 days per week as tolerated given limitations of cervical spine stenosis  Follow up in 6 months.     Orders:    rosuvastatin (Crestor) 5 mg tablet; Take 1 tablet (5 mg) by mouth once daily.    Lipid Panel; Future

## 2024-12-10 NOTE — PROGRESS NOTES
"Subjective     Patient ID: Juan Amin \"Gagan\" is a 54 y.o. male who presents for Diabetes and Hypertension.      HPI    Gagan presents for follow up of essential hypertension.   He has established with cardiology since his last visit. Saw Dr. Huynh on 9/26/2024, follow up scheduled for 12/17/2024. Dr. Huynh added  Hydralazine 25 mg TID to regimen.   Continues hydrochlorothiazide 25 mg, amlodipine 10 mg daily, lisinopril 40 mg daily, and Toprol XL 25 mg daily.     Tolerating medications well.  Denies change in vision, headache, or dizziness.   No complaints of chest pain, palpitations, or shortness of breath.    He also presents for diabetes follow-up:    Last A1c 10.4% on 8/22/2024-  ordered labs today for repeat.   Current treatment: Metformin 1000 mg BID, glipizide 5 mg BID, Ozempic 1 mg weekly  Current symptoms: hyperglycemia, paresthesia of the feet, polydipsia, and polyuria and have been unchanged.   Glucose is monitored: Checks glucose levels infrequently, but last glucose level was in the 200s when he checked last week  Eye exam current (within one year): no- referral placed today  Diet: not following a low carb diet, admits that he is eating less with addition of semaglutide but is not making good choices when he eats.He admits diet is one of his biggest barriers to compliance. Exercise: never  Current Statin treatment: Yes- rosuvastatin 5 mg      Patient's recent visit notes, medication and allergy lists, past medical surgical social hx, immunization, vitals, problem list, recent tests were reviewed by me for pertinence to this visit.    Current Outpatient Medications:     amLODIPine (Norvasc) 10 mg tablet, Take 1 tablet (10 mg) by mouth once daily., Disp: 90 tablet, Rfl: 1    aspirin 81 mg EC tablet, Take 1 tablet (81 mg) by mouth once daily., Disp: , Rfl:     gabapentin (Neurontin) 300 mg capsule, Take 1 capsule (300 mg) by mouth 3 times a day., Disp: , Rfl:     glipiZIDE (Glucotrol) 10 mg tablet, " Take 1 tablet (10 mg) by mouth 2 times a day before meals., Disp: 60 tablet, Rfl: 11    hydrALAZINE (Apresoline) 25 mg tablet, Take 1 tablet (25 mg) by mouth 3 times a day., Disp: 90 tablet, Rfl: 11    hydroCHLOROthiazide (HYDRODiuril) 25 mg tablet, TAKE 1 TABLET BY MOUTH EVERY DAY, Disp: 90 tablet, Rfl: 1    lisinopril 40 mg tablet, TAKE 1 TABLET BY MOUTH EVERY DAY, Disp: 90 tablet, Rfl: 1    metFORMIN (Glucophage) 500 mg tablet, Take 2 tablets (1,000 mg) by mouth 2 times daily (morning and late afternoon)., Disp: 360 tablet, Rfl: 1    metoprolol succinate XL (Toprol-XL) 100 mg 24 hr tablet, Take 1 tablet (100 mg) by mouth once daily. Do not crush or chew., Disp: 90 tablet, Rfl: 1    semaglutide (Ozempic) 1 mg/dose (4 mg/3 mL) pen injector, INJECT 1 MG UNDER THE SKIN 1 (ONE) TIME PER WEEK., Disp: 3 mL, Rfl: 0      Review of Systems  All other systems have been reviewed and are negative except as noted in the HPI.         Objective   /84   Pulse 84   Ht 1.829 m (6')   Wt (!) 153 kg (338 lb)   SpO2 96%   BMI 45.84 kg/m²       Physical Exam  Vitals and nursing note reviewed.   Constitutional:       General: He is not in acute distress.     Appearance: Normal appearance. He is morbidly obese. He is not ill-appearing.   Neck:      Vascular: No carotid bruit.   Cardiovascular:      Rate and Rhythm: Normal rate and regular rhythm.      Pulses: Normal pulses.      Heart sounds: Normal heart sounds, S1 normal and S2 normal. No murmur heard.  Pulmonary:      Effort: Pulmonary effort is normal. No respiratory distress.      Breath sounds: Normal breath sounds and air entry.   Musculoskeletal:      Cervical back: Normal range of motion and neck supple. No rigidity or tenderness.      Right lower leg: No edema.      Left lower leg: No edema.   Lymphadenopathy:      Cervical: No cervical adenopathy.   Skin:     General: Skin is warm and dry.      Capillary Refill: Capillary refill takes less than 2 seconds.    Neurological:      General: No focal deficit present.      Mental Status: He is alert. Mental status is at baseline.   Psychiatric:         Mood and Affect: Mood normal.         Behavior: Behavior normal. Behavior is cooperative.         Thought Content: Thought content normal.         Judgment: Judgment normal.             Assessment & Plan  Type 2 diabetes mellitus without complication, without long-term current use of insulin (Multi)  Chronic condition, needs tighter control  Increase semaglutide to 2 mg subcutaneous weekly  Continue metformin 1000 mg BID and glipizide 10 mg BID as discussed  Encourage increased frequency in monitoring glucose levels.   Discussed the importance of healthy diet, reducing simple carbs, sugars in diet to help control glucose levels.   Labs ordered at this visit, will review upon result  Follow up in 3 months  Orders:    semaglutide 2 mg/dose (8 mg/3 mL) pen injector; Inject 2 mg under the skin 1 (one) time per week.    glipiZIDE (Glucotrol) 10 mg tablet; Take 1 tablet (10 mg) by mouth 2 times a day before meals.    metFORMIN (Glucophage) 500 mg tablet; Take 2 tablets (1,000 mg) by mouth 2 times daily (morning and late afternoon).    Referral to Ophthalmology; Future    Hemoglobin A1C; Future    Primary hypertension  Chronic condition, improved from last visit, still above goal  Follow up with cardiology as scheduled next week  Continue Hydralazine 25 mg TID, hydrochlorothiazide 25 mg, amlodipine 10 mg daily, lisinopril 40 mg daily, and Toprol XL 25 mg daily.    Non pharmacological interventions such as lowering salt, saturated fats, cholesterol, and sugar diet discussed.  Discussed signs and symptoms of major cardiovascular event and need to present to the ED.   Reevaluate in 6 months.      Orders:    CBC and Auto Differential; Future    Comprehensive Metabolic Panel; Future    High cholesterol  Restart rosuvastatin 5 mg daily  Labs ordered at this visit  Reports any new onset of  muscle pain or weakness.  Continue with health diet low in saturated fats, cholesterol, sodium, and limit sugars.  Exercise 30-45 minutes 5 days per week as tolerated given limitations of cervical spine stenosis  Follow up in 6 months.     Orders:    rosuvastatin (Crestor) 5 mg tablet; Take 1 tablet (5 mg) by mouth once daily.    Lipid Panel; Future          Patient understands and agrees with treatment plan.    Courtney Watts, APRN-CNP

## 2024-12-10 NOTE — ASSESSMENT & PLAN NOTE
Chronic condition, needs tighter control  Increase semaglutide to 2 mg subcutaneous weekly  Continue metformin 1000 mg BID and glipizide 10 mg BID as discussed  Encourage increased frequency in monitoring glucose levels.   Discussed the importance of healthy diet, reducing simple carbs, sugars in diet to help control glucose levels.   Labs ordered at this visit, will review upon result  Follow up in 3 months  Orders:    semaglutide 2 mg/dose (8 mg/3 mL) pen injector; Inject 2 mg under the skin 1 (one) time per week.    glipiZIDE (Glucotrol) 10 mg tablet; Take 1 tablet (10 mg) by mouth 2 times a day before meals.    metFORMIN (Glucophage) 500 mg tablet; Take 2 tablets (1,000 mg) by mouth 2 times daily (morning and late afternoon).    Referral to Ophthalmology; Future    Hemoglobin A1C; Future

## 2024-12-17 ENCOUNTER — OFFICE VISIT (OUTPATIENT)
Dept: CARDIOLOGY | Facility: CLINIC | Age: 54
End: 2024-12-17
Payer: COMMERCIAL

## 2024-12-19 ENCOUNTER — APPOINTMENT (OUTPATIENT)
Dept: CARDIOLOGY | Facility: CLINIC | Age: 54
End: 2024-12-19
Payer: COMMERCIAL

## 2025-01-09 ENCOUNTER — OFFICE VISIT (OUTPATIENT)
Dept: CARDIOLOGY | Facility: CLINIC | Age: 55
End: 2025-01-09
Payer: COMMERCIAL

## 2025-02-06 ENCOUNTER — APPOINTMENT (OUTPATIENT)
Dept: CARDIOLOGY | Facility: CLINIC | Age: 55
End: 2025-02-06
Payer: COMMERCIAL

## 2025-02-17 ENCOUNTER — TELEPHONE (OUTPATIENT)
Dept: CARDIOLOGY | Facility: CLINIC | Age: 55
End: 2025-02-17
Payer: COMMERCIAL

## 2025-02-17 NOTE — TELEPHONE ENCOUNTER
Received paperwork for Short-term Disability. Patient has not been seen since Sept 2024. Tried calling patient to find out if there is another provider that should be completing his paperwork. Left message for call back

## 2025-02-19 ENCOUNTER — APPOINTMENT (OUTPATIENT)
Dept: PREADMISSION TESTING | Facility: HOSPITAL | Age: 55
End: 2025-02-19
Payer: COMMERCIAL

## 2025-02-21 NOTE — TELEPHONE ENCOUNTER
Dr Huynh said he treats pt for HTN and pt is not physically disabled due to this, called [facility] and they asked for a letter stating this and a copy of recent notes

## 2025-02-25 ENCOUNTER — APPOINTMENT (OUTPATIENT)
Dept: PREADMISSION TESTING | Facility: HOSPITAL | Age: 55
End: 2025-02-25
Payer: COMMERCIAL

## 2025-02-27 ENCOUNTER — APPOINTMENT (OUTPATIENT)
Dept: CARDIOLOGY | Facility: CLINIC | Age: 55
End: 2025-02-27
Payer: COMMERCIAL

## 2025-03-03 ENCOUNTER — OFFICE VISIT (OUTPATIENT)
Dept: ORTHOPEDIC SURGERY | Facility: CLINIC | Age: 55
End: 2025-03-03
Payer: COMMERCIAL

## 2025-03-03 VITALS — WEIGHT: 315 LBS | BODY MASS INDEX: 42.66 KG/M2 | HEIGHT: 72 IN

## 2025-03-03 DIAGNOSIS — M47.12 CERVICAL SPONDYLOSIS WITH MYELOPATHY: Primary | ICD-10-CM

## 2025-03-03 PROCEDURE — 3008F BODY MASS INDEX DOCD: CPT | Performed by: ORTHOPAEDIC SURGERY

## 2025-03-03 PROCEDURE — 1036F TOBACCO NON-USER: CPT | Performed by: ORTHOPAEDIC SURGERY

## 2025-03-03 PROCEDURE — 4010F ACE/ARB THERAPY RXD/TAKEN: CPT | Performed by: ORTHOPAEDIC SURGERY

## 2025-03-03 PROCEDURE — 99213 OFFICE O/P EST LOW 20 MIN: CPT | Performed by: ORTHOPAEDIC SURGERY

## 2025-03-03 ASSESSMENT — PAIN - FUNCTIONAL ASSESSMENT: PAIN_FUNCTIONAL_ASSESSMENT: 0-10

## 2025-03-03 ASSESSMENT — PAIN SCALES - GENERAL: PAINLEVEL_OUTOF10: 4

## 2025-03-03 NOTE — LETTER
March 3, 2025     Courtney Watts, APRN-CNP  [address]  [address]  [place] [address]    Patient: Gagan Amin   YOB: 1970   Date of Visit: 3/3/2025       Dear Dr. Courtney Watts, APRN-CNP:    Thank you for referring Gagan Amin to me for evaluation. Below are my notes for this consultation.  If you have questions, please do not hesitate to call me. I look forward to following your patient along with you.       Sincerely,     Benjie Chadwick MD      CC: No Recipients  ______________________________________________________________________________________    Patient returns for follow-up.  He has been scheduled for C4-7 ACDF for quite some time of his cervical spinal cord compression and myelopathy.  We have delayed his surgery multiple times due to uncontrolled diabetes and poorly controlled hypertension.    He was scheduled for surgery this week but decided to cancel it again.  He is unfortunately losing his insurance this month.  He is concerned that he needs more help and wants to delay things.    His symptoms are more or less static.  He does have gait instability and falls frequently, this is nothing new.  He also has stable neurologic symptoms in his upper extremities.    We discussed continued observation at this point.  As long as his symptoms are not progressing that is okay to wait.  I do think he is better served by continuing to work on aerobic exercise, weight loss, and better control of his medical comorbidities such as diabetes and hypertension before proceeding with surgery.    I did explain to him though that if his symptoms rapidly worsen, there is substantial loss in function, he needs to come to the emergency room immediately.  Otherwise he can follow-up with me on an as-needed basis.    *This note was dictated using speech recognition software and was not corrected for spelling or grammatical errors*

## 2025-03-03 NOTE — PROGRESS NOTES
Patient returns for follow-up.  He has been scheduled for C4-7 ACDF for quite some time of his cervical spinal cord compression and myelopathy.  We have delayed his surgery multiple times due to uncontrolled diabetes and poorly controlled hypertension.    He was scheduled for surgery this week but decided to cancel it again.  He is unfortunately losing his insurance this month.  He is concerned that he needs more help and wants to delay things.    His symptoms are more or less static.  He does have gait instability and falls frequently, this is nothing new.  He also has stable neurologic symptoms in his upper extremities.    We discussed continued observation at this point.  As long as his symptoms are not progressing that is okay to wait.  I do think he is better served by continuing to work on aerobic exercise, weight loss, and better control of his medical comorbidities such as diabetes and hypertension before proceeding with surgery.    I did explain to him though that if his symptoms rapidly worsen, there is substantial loss in function, he needs to come to the emergency room immediately.  Otherwise he can follow-up with me on an as-needed basis.    *This note was dictated using speech recognition software and was not corrected for spelling or grammatical errors*

## 2025-03-05 DIAGNOSIS — I10 PRIMARY HYPERTENSION: ICD-10-CM

## 2025-03-05 RX ORDER — METOPROLOL SUCCINATE 100 MG/1
100 TABLET, EXTENDED RELEASE ORAL DAILY
Qty: 90 TABLET | Refills: 1 | Status: SHIPPED | OUTPATIENT
Start: 2025-03-05

## 2025-03-05 RX ORDER — AMLODIPINE BESYLATE 10 MG/1
10 TABLET ORAL DAILY
Qty: 90 TABLET | Refills: 1 | Status: SHIPPED | OUTPATIENT
Start: 2025-03-05

## 2025-03-11 ENCOUNTER — APPOINTMENT (OUTPATIENT)
Dept: CARDIOLOGY | Facility: CLINIC | Age: 55
End: 2025-03-11
Payer: COMMERCIAL

## 2025-03-13 ENCOUNTER — APPOINTMENT (OUTPATIENT)
Dept: PRIMARY CARE | Facility: CLINIC | Age: 55
End: 2025-03-13
Payer: COMMERCIAL

## 2025-03-13 DIAGNOSIS — M1A.09X0 CHRONIC GOUT OF MULTIPLE SITES, UNSPECIFIED CAUSE: ICD-10-CM

## 2025-03-13 RX ORDER — ALLOPURINOL 300 MG/1
300 TABLET ORAL DAILY
Qty: 90 TABLET | Refills: 1 | Status: SHIPPED | OUTPATIENT
Start: 2025-03-13

## 2025-03-13 ASSESSMENT — ENCOUNTER SYMPTOMS
PND: 0
NECK PAIN: 0
HEADACHES: 0
ORTHOPNEA: 0
HYPERTENSION: 1
PALPITATIONS: 0
BLURRED VISION: 0
SHORTNESS OF BREATH: 0
SWEATS: 0

## 2025-03-14 ENCOUNTER — TELEPHONE (OUTPATIENT)
Dept: PRIMARY CARE | Facility: CLINIC | Age: 55
End: 2025-03-14

## 2025-03-14 ENCOUNTER — APPOINTMENT (OUTPATIENT)
Dept: PRIMARY CARE | Facility: CLINIC | Age: 55
End: 2025-03-14
Payer: COMMERCIAL

## 2025-03-14 VITALS
DIASTOLIC BLOOD PRESSURE: 80 MMHG | SYSTOLIC BLOOD PRESSURE: 150 MMHG | BODY MASS INDEX: 42.66 KG/M2 | HEART RATE: 84 BPM | WEIGHT: 315 LBS | OXYGEN SATURATION: 98 % | HEIGHT: 72 IN | TEMPERATURE: 97.5 F

## 2025-03-14 DIAGNOSIS — I10 PRIMARY HYPERTENSION: ICD-10-CM

## 2025-03-14 DIAGNOSIS — E11.9 TYPE 2 DIABETES MELLITUS WITHOUT COMPLICATION, WITHOUT LONG-TERM CURRENT USE OF INSULIN (MULTI): ICD-10-CM

## 2025-03-14 DIAGNOSIS — E11.9 TYPE 2 DIABETES MELLITUS WITHOUT COMPLICATION, WITHOUT LONG-TERM CURRENT USE OF INSULIN (MULTI): Primary | ICD-10-CM

## 2025-03-14 DIAGNOSIS — Z12.5 SCREENING FOR PROSTATE CANCER: Primary | ICD-10-CM

## 2025-03-14 DIAGNOSIS — E55.9 VITAMIN D DEFICIENCY: ICD-10-CM

## 2025-03-14 DIAGNOSIS — E53.8 VITAMIN B12 DEFICIENCY: ICD-10-CM

## 2025-03-14 DIAGNOSIS — E78.00 HIGH CHOLESTEROL: ICD-10-CM

## 2025-03-14 LAB — POC HEMOGLOBIN A1C: 9 % (ref 4.2–6.5)

## 2025-03-14 PROCEDURE — G8433 SCR FOR DEP NOT CPT DOC RSN: HCPCS

## 2025-03-14 PROCEDURE — 3008F BODY MASS INDEX DOCD: CPT

## 2025-03-14 PROCEDURE — 83036 HEMOGLOBIN GLYCOSYLATED A1C: CPT

## 2025-03-14 PROCEDURE — 3077F SYST BP >= 140 MM HG: CPT

## 2025-03-14 PROCEDURE — 4010F ACE/ARB THERAPY RXD/TAKEN: CPT

## 2025-03-14 PROCEDURE — 99214 OFFICE O/P EST MOD 30 MIN: CPT

## 2025-03-14 PROCEDURE — 3079F DIAST BP 80-89 MM HG: CPT

## 2025-03-14 RX ORDER — HYDROCHLOROTHIAZIDE 50 MG/1
50 TABLET ORAL DAILY
Qty: 90 TABLET | Refills: 0 | Status: SHIPPED | OUTPATIENT
Start: 2025-03-14

## 2025-03-14 ASSESSMENT — ENCOUNTER SYMPTOMS
BLURRED VISION: 0
HYPERTENSION: 1
HEADACHES: 0
PND: 0
NECK PAIN: 0
PALPITATIONS: 0
SWEATS: 0
SHORTNESS OF BREATH: 0
ORTHOPNEA: 0

## 2025-03-14 NOTE — PROGRESS NOTES
"Subjective     Patient ID: Juan Amin \"Gagan\" is a 54 y.o. male who presents for Diabetes.      Diabetes Mellitus  Patient presents for follow up of diabetes. Current symptoms include: hyperglycemia. Symptoms have progressed to a point and plateaued. Patient denies hypoglycemia , increased appetite, polydipsia, and polyuria. He is not currently checking his glucose levels.  Current treatment: Ozempic 2 mg weekly, metformin 1000 mg BID, glipizide 10 mg twice daily.  No recent eye exam for retinopathy. On statin.       Hypertension  This is a chronic problem. The current episode started more than 1 year ago. The problem has been waxing and waning since onset. The problem is resistant. Pertinent negatives include no anxiety, blurred vision, chest pain, headaches, malaise/fatigue, neck pain, orthopnea, palpitations, peripheral edema, PND, shortness of breath or sweats. Agents associated with hypertension include NSAIDs. Risk factors for coronary artery disease include diabetes mellitus, obesity and sedentary lifestyle. Compliance problems include diet and exercise.        Patient's recent visit notes, medication and allergy lists, past medical surgical social hx, immunization, vitals, problem list, recent tests were reviewed by me for pertinence to this visit.        Review of Systems   Constitutional:  Negative for malaise/fatigue.   Eyes:  Negative for blurred vision.   Respiratory:  Negative for shortness of breath.    Cardiovascular:  Negative for chest pain, palpitations, orthopnea and PND.   Musculoskeletal:  Negative for neck pain.   Neurological:  Negative for headaches.       Objective   /80 (BP Location: Left arm, Patient Position: Sitting, BP Cuff Size: Adult)   Pulse 84   Temp 36.4 °C (97.5 °F) (Temporal)   Ht 1.829 m (6')   Wt (!) 156 kg (343 lb 12.8 oz)   SpO2 98%   BMI 46.63 kg/m²       Physical Exam  Vitals and nursing note reviewed.   Constitutional:       General: He is not in acute " distress.     Appearance: Normal appearance. He is morbidly obese. He is not ill-appearing.   Neck:      Vascular: No carotid bruit.   Cardiovascular:      Rate and Rhythm: Normal rate and regular rhythm.      Pulses: Normal pulses.      Heart sounds: Normal heart sounds, S1 normal and S2 normal. No murmur heard.  Pulmonary:      Effort: Pulmonary effort is normal. No respiratory distress.      Breath sounds: Normal breath sounds and air entry.   Musculoskeletal:      Cervical back: Normal range of motion and neck supple. No rigidity or tenderness.      Right lower le+ Pitting Edema present.      Left lower le+ Pitting Edema present.   Lymphadenopathy:      Cervical: No cervical adenopathy.   Skin:     General: Skin is warm and dry.      Capillary Refill: Capillary refill takes less than 2 seconds.   Neurological:      General: No focal deficit present.      Mental Status: He is alert. Mental status is at baseline.   Psychiatric:         Mood and Affect: Mood normal.         Behavior: Behavior normal. Behavior is cooperative.         Thought Content: Thought content normal.         Judgment: Judgment normal.       Assessment & Plan  Type 2 diabetes mellitus without complication, without long-term current use of insulin (Multi)  Chronic condition, needs tighter symptom management.  A1c above goal at 9.0%.  Start empagliflozin 10 mg by mouth daily, continue glipizide 10 mg by mouth twice daily, metformin 1000 mg twice daily, and semaglutide 2 mg subcutaneous weekly as prescribed.  Discussed the importance of diet interventions to help control glucose levels.  He admits that he does eat more when he is stressed out and has been experiencing much more stress recently with the loss of his job.  He also has concerns for loss of insurance.  Follow-up in 3 months as discussed.  Orders:    POCT glycosylated hemoglobin (Hb A1C) manually resulted    Collection capillary blood specimen    empagliflozin (Jardiance) 10  mg; Take 1 tablet (10 mg) by mouth once daily.    Primary hypertension  Chronic condition, needing tighter control but is improved over previous findings  Continue metoprolol 100 mg by mouth daily, lisinopril 40 mg by mouth daily, amlodipine 10 mg daily, hydralazine 25 mg 3 times a day.  Will plan to increase hydrochlorothiazide to 50 mg by mouth daily as he is having some lower extremity edema.  Explained intended effects, potential side effects, and schedule of dosages of the medication.  Discussed him following up with cardiology as he missed his most recent appointment this past week.  He will plan to reschedule that appointment.  Orders:    hydroCHLOROthiazide (HYDRODiuril) 50 mg tablet; Take 1 tablet (50 mg) by mouth once daily.      Courtney Watts, APRN-CNP

## 2025-03-14 NOTE — ASSESSMENT & PLAN NOTE
Chronic condition, needs tighter symptom management.  A1c above goal at 9.0%.  Start empagliflozin 10 mg by mouth daily, continue glipizide 10 mg by mouth twice daily, metformin 1000 mg twice daily, and semaglutide 2 mg subcutaneous weekly as prescribed.  Discussed the importance of diet interventions to help control glucose levels.  He admits that he does eat more when he is stressed out and has been experiencing much more stress recently with the loss of his job.  He also has concerns for loss of insurance.  Follow-up in 3 months as discussed.  Orders:    POCT glycosylated hemoglobin (Hb A1C) manually resulted    Collection capillary blood specimen    empagliflozin (Jardiance) 10 mg; Take 1 tablet (10 mg) by mouth once daily.

## 2025-03-22 LAB — PSA SERPL-MCNC: 0.56 NG/ML

## 2025-04-15 ENCOUNTER — APPOINTMENT (OUTPATIENT)
Dept: PRIMARY CARE | Facility: CLINIC | Age: 55
End: 2025-04-15
Payer: COMMERCIAL

## 2025-05-19 ENCOUNTER — TELEPHONE (OUTPATIENT)
Dept: PRIMARY CARE | Facility: CLINIC | Age: 55
End: 2025-05-19
Payer: MEDICARE

## 2025-05-19 DIAGNOSIS — I10 PRIMARY HYPERTENSION: ICD-10-CM

## 2025-05-19 RX ORDER — LISINOPRIL 40 MG/1
40 TABLET ORAL DAILY
Qty: 90 TABLET | Refills: 1 | Status: SHIPPED | OUTPATIENT
Start: 2025-05-19

## 2025-05-19 NOTE — TELEPHONE ENCOUNTER
Med refill on Lisinopril  Send to [facility] on [address] in [place]  Last seen on 03/14/2025

## 2025-06-06 DIAGNOSIS — I10 PRIMARY HYPERTENSION: ICD-10-CM

## 2025-06-06 DIAGNOSIS — E11.9 TYPE 2 DIABETES MELLITUS WITHOUT COMPLICATION, WITHOUT LONG-TERM CURRENT USE OF INSULIN: ICD-10-CM

## 2025-06-06 DIAGNOSIS — E78.00 HIGH CHOLESTEROL: ICD-10-CM

## 2025-06-06 DIAGNOSIS — E55.9 VITAMIN D DEFICIENCY: ICD-10-CM

## 2025-06-06 DIAGNOSIS — E53.8 VITAMIN B12 DEFICIENCY: ICD-10-CM

## 2025-06-16 ENCOUNTER — APPOINTMENT (OUTPATIENT)
Dept: PRIMARY CARE | Facility: CLINIC | Age: 55
End: 2025-06-16
Payer: COMMERCIAL

## 2025-06-26 ENCOUNTER — APPOINTMENT (OUTPATIENT)
Facility: CLINIC | Age: 55
End: 2025-06-26
Payer: MEDICARE

## 2025-06-30 DIAGNOSIS — I10 PRIMARY HYPERTENSION: ICD-10-CM

## 2025-06-30 RX ORDER — HYDROCHLOROTHIAZIDE 50 MG/1
50 TABLET ORAL DAILY
Qty: 90 TABLET | Refills: 0 | Status: SHIPPED | OUTPATIENT
Start: 2025-06-30

## 2025-07-03 ENCOUNTER — OFFICE VISIT (OUTPATIENT)
Facility: CLINIC | Age: 55
End: 2025-07-03
Payer: MEDICARE

## 2025-07-03 VITALS
SYSTOLIC BLOOD PRESSURE: 144 MMHG | WEIGHT: 315 LBS | HEIGHT: 72 IN | DIASTOLIC BLOOD PRESSURE: 78 MMHG | RESPIRATION RATE: 16 BRPM | BODY MASS INDEX: 42.66 KG/M2

## 2025-07-03 DIAGNOSIS — M48.02 CERVICAL SPINAL STENOSIS: Primary | ICD-10-CM

## 2025-07-03 DIAGNOSIS — M25.511 BILATERAL SHOULDER PAIN, UNSPECIFIED CHRONICITY: ICD-10-CM

## 2025-07-03 DIAGNOSIS — M25.512 BILATERAL SHOULDER PAIN, UNSPECIFIED CHRONICITY: ICD-10-CM

## 2025-07-03 PROCEDURE — 3077F SYST BP >= 140 MM HG: CPT | Performed by: ANESTHESIOLOGY

## 2025-07-03 PROCEDURE — 3052F HG A1C>EQUAL 8.0%<EQUAL 9.0%: CPT | Performed by: ANESTHESIOLOGY

## 2025-07-03 PROCEDURE — 99214 OFFICE O/P EST MOD 30 MIN: CPT | Performed by: ANESTHESIOLOGY

## 2025-07-03 PROCEDURE — 3078F DIAST BP <80 MM HG: CPT | Performed by: ANESTHESIOLOGY

## 2025-07-03 PROCEDURE — 4010F ACE/ARB THERAPY RXD/TAKEN: CPT | Performed by: ANESTHESIOLOGY

## 2025-07-03 PROCEDURE — 3008F BODY MASS INDEX DOCD: CPT | Performed by: ANESTHESIOLOGY

## 2025-07-03 RX ORDER — GABAPENTIN 300 MG/1
300 CAPSULE ORAL 3 TIMES DAILY
Qty: 90 CAPSULE | Refills: 2 | Status: SHIPPED | OUTPATIENT
Start: 2025-07-03 | End: 2025-08-02

## 2025-07-03 RX ORDER — TIZANIDINE 4 MG/1
4 TABLET ORAL NIGHTLY
Qty: 90 TABLET | Refills: 0 | Status: SHIPPED | OUTPATIENT
Start: 2025-07-03 | End: 2025-10-01

## 2025-07-03 ASSESSMENT — ENCOUNTER SYMPTOMS
ENDOCRINE NEGATIVE: 1
CARDIOVASCULAR NEGATIVE: 1
BACK PAIN: 1
NEUROLOGICAL NEGATIVE: 1
RESPIRATORY NEGATIVE: 1
ALLERGIC/IMMUNOLOGIC NEGATIVE: 1
EYES NEGATIVE: 1
OCCASIONAL FEELINGS OF UNSTEADINESS: 1
CONSTITUTIONAL NEGATIVE: 1
LOSS OF SENSATION IN FEET: 1
DEPRESSION: 0
PSYCHIATRIC NEGATIVE: 1
HEMATOLOGIC/LYMPHATIC NEGATIVE: 1
GASTROINTESTINAL NEGATIVE: 1

## 2025-07-03 ASSESSMENT — PATIENT HEALTH QUESTIONNAIRE - PHQ9
1. LITTLE INTEREST OR PLEASURE IN DOING THINGS: NOT AT ALL
SUM OF ALL RESPONSES TO PHQ9 QUESTIONS 1 AND 2: 0
2. FEELING DOWN, DEPRESSED OR HOPELESS: NOT AT ALL

## 2025-07-03 ASSESSMENT — PAIN DESCRIPTION - DESCRIPTORS: DESCRIPTORS: ACHING;SHARP

## 2025-07-03 ASSESSMENT — PAIN - FUNCTIONAL ASSESSMENT: PAIN_FUNCTIONAL_ASSESSMENT: 0-10

## 2025-07-03 ASSESSMENT — PAIN SCALES - GENERAL
PAINLEVEL_OUTOF10: 4
PAINLEVEL_OUTOF10: 4

## 2025-07-03 ASSESSMENT — LIFESTYLE VARIABLES: TOTAL SCORE: 0

## 2025-07-03 NOTE — PROGRESS NOTES
"Subjective   Patient ID: Jaun Amin \"Gagan\" is a 54 y.o. male who presents for Shoulder Pain.  Shoulder Pain   Patient here today for follow up.  He suffers with severe spinal stenosis.  He was supposed to have surgery with Dr. Chadwick on 3 different occassional but it was cancelled because of his HTN.        He has been suffering with bilateral shoulder pain.  He states they are both very painful.  He has had several falls because of his cord compression and always falsl ont he left.  When he lifts his arms above his head he will get severe pain and his shoulders will lock up.  He describes it as a very severe ache in both shoulders.  He has a hard time reaching the top shelf of his cabinets.  He also has a hard time washing his hair.      Review of Systems   Constitutional: Negative.    HENT: Negative.     Eyes: Negative.    Respiratory: Negative.     Cardiovascular: Negative.    Gastrointestinal: Negative.    Endocrine: Negative.    Genitourinary: Negative.    Musculoskeletal:  Positive for back pain.   Skin: Negative.    Allergic/Immunologic: Negative.    Neurological: Negative.    Hematological: Negative.    Psychiatric/Behavioral: Negative.         Objective   Physical Exam  Constitutional:       Appearance: Normal appearance. He is normal weight.   HENT:      Head: Normocephalic and atraumatic.      Nose: Nose normal.      Mouth/Throat:      Mouth: Mucous membranes are moist.      Pharynx: Oropharynx is clear.   Eyes:      Conjunctiva/sclera: Conjunctivae normal.      Pupils: Pupils are equal, round, and reactive to light.   Cardiovascular:      Rate and Rhythm: Normal rate.      Pulses: Normal pulses.   Pulmonary:      Effort: Pulmonary effort is normal. No respiratory distress.   Skin:     General: Skin is warm and dry.   Neurological:      Mental Status: He is alert.   Psychiatric:         Mood and Affect: Mood normal.       Assessment/Plan   Problem List Items Addressed This Visit    None  Visit " Diagnoses         Codes      Cervical spinal stenosis    -  Primary M48.02      Bilateral shoulder pain, unspecified chronicity     M25.511, M25.512          I nice discussion with the patient today our plan will be as follows.    Radiology: Patient to have bilateral Shoulder Xrays completed.     Physically:      Psychologically:  No issues at this time.     Medication: ***    Duration:  > 1 year    Intervention:  ***        Please note that this report has been produced using speech recognition software. It may contain errors related to grammar, punctuation or spelling. Electronically signed, but not reviewed.          Ector Crockett MD 07/03/25 2:41 PM    range of motion.  Patient needs to also restart home PT program for his neck.    Psychologically:  No issues at this time.     Medication: Gabapentin and tizanidine both refilled today.  A PDMP report was checked today, and was consistent with reported prescribing.    Duration:  > 1 year    Intervention: Patient had bilateral shoulder injections completed.  I did discuss with him about good blood pressure control and good blood sugar control in the setting of steroids.  He did voice understanding.  Patient states he is mis reach out to his primary care provider to get under a better diabetic control or a referral to endocrinology.  A PDMP report was checked today, and was consistent with reported prescribing.    Patient should also follow schedule up with Dr. Chadwick in preparation for much-needed cervical decompression as he is myelopathic and has severe cord compression.        Please note that this report has been produced using speech recognition software. It may contain errors related to grammar, punctuation or spelling. Electronically signed, but not reviewed.          Ector Crockett MD 07/03/25 2:41 PM

## 2025-07-07 ASSESSMENT — ENCOUNTER SYMPTOMS
WEAKNESS: 1
NECK STIFFNESS: 1
NECK PAIN: 1
MYALGIAS: 1
ARTHRALGIAS: 1

## 2025-07-09 ENCOUNTER — HOSPITAL ENCOUNTER (OUTPATIENT)
Dept: RADIOLOGY | Facility: HOSPITAL | Age: 55
Discharge: HOME | End: 2025-07-09
Payer: MEDICARE

## 2025-07-09 DIAGNOSIS — M25.511 BILATERAL SHOULDER PAIN, UNSPECIFIED CHRONICITY: ICD-10-CM

## 2025-07-09 DIAGNOSIS — M25.512 BILATERAL SHOULDER PAIN, UNSPECIFIED CHRONICITY: ICD-10-CM

## 2025-07-09 PROCEDURE — 73030 X-RAY EXAM OF SHOULDER: CPT | Mod: 50

## 2025-07-09 PROCEDURE — 73030 X-RAY EXAM OF SHOULDER: CPT | Mod: BILATERAL PROCEDURE | Performed by: RADIOLOGY

## 2025-08-01 ENCOUNTER — PREP FOR PROCEDURE (OUTPATIENT)
Dept: ORTHOPEDIC SURGERY | Facility: CLINIC | Age: 55
End: 2025-08-01
Payer: MEDICARE

## 2025-08-01 DIAGNOSIS — M54.12 CERVICAL RADICULITIS: ICD-10-CM

## 2025-08-01 DIAGNOSIS — M47.12 CERVICAL SPONDYLOSIS WITH MYELOPATHY: Primary | ICD-10-CM

## 2025-08-01 DIAGNOSIS — G95.20 SPINAL CORD COMPRESSION (MULTI): ICD-10-CM

## 2025-08-01 DIAGNOSIS — M48.02 SPINAL STENOSIS IN CERVICAL REGION: ICD-10-CM

## 2025-08-01 RX ORDER — ACETAMINOPHEN 325 MG/1
975 TABLET ORAL ONCE
OUTPATIENT
Start: 2025-08-01 | End: 2025-08-01

## 2025-08-01 RX ORDER — CEFAZOLIN SODIUM 2 G/100ML
2 INJECTION, SOLUTION INTRAVENOUS ONCE
OUTPATIENT
Start: 2025-08-01 | End: 2025-08-01

## 2025-08-01 RX ORDER — GABAPENTIN 300 MG/1
600 CAPSULE ORAL ONCE
OUTPATIENT
Start: 2025-08-01 | End: 2025-08-01

## 2025-08-11 ENCOUNTER — OFFICE VISIT (OUTPATIENT)
Dept: PAIN MEDICINE | Facility: CLINIC | Age: 55
End: 2025-08-11
Payer: MEDICARE

## 2025-08-11 VITALS
DIASTOLIC BLOOD PRESSURE: 82 MMHG | BODY MASS INDEX: 42.66 KG/M2 | SYSTOLIC BLOOD PRESSURE: 140 MMHG | HEIGHT: 72 IN | WEIGHT: 315 LBS

## 2025-08-11 DIAGNOSIS — M25.511 BILATERAL SHOULDER PAIN, UNSPECIFIED CHRONICITY: ICD-10-CM

## 2025-08-11 DIAGNOSIS — M25.512 BILATERAL SHOULDER PAIN, UNSPECIFIED CHRONICITY: ICD-10-CM

## 2025-08-11 PROCEDURE — 3008F BODY MASS INDEX DOCD: CPT | Performed by: ANESTHESIOLOGY

## 2025-08-11 PROCEDURE — 4010F ACE/ARB THERAPY RXD/TAKEN: CPT | Performed by: ANESTHESIOLOGY

## 2025-08-11 PROCEDURE — 99214 OFFICE O/P EST MOD 30 MIN: CPT | Performed by: ANESTHESIOLOGY

## 2025-08-11 PROCEDURE — 2500000004 HC RX 250 GENERAL PHARMACY W/ HCPCS (ALT 636 FOR OP/ED): Performed by: ANESTHESIOLOGY

## 2025-08-11 PROCEDURE — 3079F DIAST BP 80-89 MM HG: CPT | Performed by: ANESTHESIOLOGY

## 2025-08-11 PROCEDURE — 99213 OFFICE O/P EST LOW 20 MIN: CPT | Mod: 25

## 2025-08-11 PROCEDURE — 20605 DRAIN/INJ JOINT/BURSA W/O US: CPT | Performed by: ANESTHESIOLOGY

## 2025-08-11 PROCEDURE — 3052F HG A1C>EQUAL 8.0%<EQUAL 9.0%: CPT | Performed by: ANESTHESIOLOGY

## 2025-08-11 PROCEDURE — 3077F SYST BP >= 140 MM HG: CPT | Performed by: ANESTHESIOLOGY

## 2025-08-11 RX ORDER — TRIAMCINOLONE ACETONIDE 40 MG/ML
80 INJECTION, SUSPENSION INTRA-ARTICULAR; INTRAMUSCULAR ONCE
Status: COMPLETED | OUTPATIENT
Start: 2025-08-11 | End: 2025-08-11

## 2025-08-11 RX ORDER — BUPIVACAINE HYDROCHLORIDE 5 MG/ML
8 INJECTION, SOLUTION EPIDURAL; INTRACAUDAL; PERINEURAL ONCE
Status: COMPLETED | OUTPATIENT
Start: 2025-08-11 | End: 2025-08-11

## 2025-08-11 RX ORDER — SUZETRIGINE 50 MG/1
TABLET, FILM COATED ORAL
Qty: 29 TABLET | Refills: 0 | Status: SHIPPED | OUTPATIENT
Start: 2025-08-11

## 2025-08-11 RX ADMIN — TRIAMCINOLONE ACETONIDE 80 MG: 40 SUSPENSION INTRA-ARTERIAL; INTRAMUSCULAR at 10:24

## 2025-08-11 RX ADMIN — BUPIVACAINE HYDROCHLORIDE 40 MG: 5 INJECTION, SOLUTION EPIDURAL; INTRACAUDAL; PERINEURAL at 10:23

## 2025-08-11 ASSESSMENT — PAIN DESCRIPTION - DESCRIPTORS: DESCRIPTORS: ACHING

## 2025-08-11 ASSESSMENT — ENCOUNTER SYMPTOMS
ENDOCRINE NEGATIVE: 1
CONSTITUTIONAL NEGATIVE: 1
ALLERGIC/IMMUNOLOGIC NEGATIVE: 1
PSYCHIATRIC NEGATIVE: 1
WEAKNESS: 1
NECK STIFFNESS: 1
GASTROINTESTINAL NEGATIVE: 1
EYES NEGATIVE: 1
MYALGIAS: 1
RESPIRATORY NEGATIVE: 1
HEMATOLOGIC/LYMPHATIC NEGATIVE: 1
BACK PAIN: 1
ARTHRALGIAS: 1
NECK PAIN: 1
CARDIOVASCULAR NEGATIVE: 1

## 2025-08-11 ASSESSMENT — PAIN SCALES - GENERAL
PAINLEVEL_OUTOF10: 6
PAINLEVEL_OUTOF10: 6

## 2025-08-11 ASSESSMENT — PAIN - FUNCTIONAL ASSESSMENT: PAIN_FUNCTIONAL_ASSESSMENT: 0-10

## 2025-08-13 ASSESSMENT — PROMIS GLOBAL HEALTH SCALE
RATE_AVERAGE_PAIN: 7
RATE_SOCIAL_SATISFACTION: FAIR
CARRYOUT_PHYSICAL_ACTIVITIES: A LITTLE
RATE_QUALITY_OF_LIFE: FAIR
RATE_PHYSICAL_HEALTH: POOR
RATE_GENERAL_HEALTH: POOR
EMOTIONAL_PROBLEMS: RARELY
RATE_AVERAGE_FATIGUE: MODERATE
CARRYOUT_SOCIAL_ACTIVITIES: FAIR
RATE_MENTAL_HEALTH: EXCELLENT

## 2025-08-14 DIAGNOSIS — M47.12 CERVICAL SPONDYLOSIS WITH MYELOPATHY: ICD-10-CM

## 2025-08-14 DIAGNOSIS — G95.20 SPINAL CORD COMPRESSION (MULTI): Primary | ICD-10-CM

## 2025-08-14 LAB
25(OH)D3+25(OH)D2 SERPL-MCNC: 49 NG/ML (ref 30–100)
ALBUMIN SERPL-MCNC: 4.3 G/DL (ref 3.6–5.1)
ALBUMIN/CREAT UR: 823 MG/G CREAT
ALP SERPL-CCNC: 48 U/L (ref 35–144)
ALT SERPL-CCNC: 20 U/L (ref 9–46)
ANION GAP SERPL CALCULATED.4IONS-SCNC: 10 MMOL/L (CALC) (ref 7–17)
APPEARANCE UR: CLEAR
AST SERPL-CCNC: 16 U/L (ref 10–35)
BACTERIA #/AREA URNS HPF: ABNORMAL /HPF
BASOPHILS # BLD AUTO: 20 CELLS/UL (ref 0–200)
BASOPHILS NFR BLD AUTO: 0.2 %
BILIRUB SERPL-MCNC: 0.4 MG/DL (ref 0.2–1.2)
BILIRUB UR QL STRIP: NEGATIVE
BUN SERPL-MCNC: 24 MG/DL (ref 7–25)
CALCIUM SERPL-MCNC: 9.3 MG/DL (ref 8.6–10.3)
CHLORIDE SERPL-SCNC: 100 MMOL/L (ref 98–110)
CHOLEST SERPL-MCNC: 130 MG/DL
CHOLEST/HDLC SERPL: 2.8 (CALC)
CO2 SERPL-SCNC: 31 MMOL/L (ref 20–32)
COLOR UR: YELLOW
CREAT SERPL-MCNC: 0.97 MG/DL (ref 0.7–1.3)
CREAT UR-MCNC: 118 MG/DL (ref 20–320)
EGFRCR SERPLBLD CKD-EPI 2021: 92 ML/MIN/1.73M2
EOSINOPHIL # BLD AUTO: 10 CELLS/UL (ref 15–500)
EOSINOPHIL NFR BLD AUTO: 0.1 %
ERYTHROCYTE [DISTWIDTH] IN BLOOD BY AUTOMATED COUNT: 14.1 % (ref 11–15)
EST. AVERAGE GLUCOSE BLD GHB EST-MCNC: 266 MG/DL
EST. AVERAGE GLUCOSE BLD GHB EST-SCNC: 14.7 MMOL/L
GLUCOSE SERPL-MCNC: 323 MG/DL (ref 65–99)
GLUCOSE UR QL STRIP: ABNORMAL
HBA1C MFR BLD: 10.9 %
HCT VFR BLD AUTO: 43.6 % (ref 38.5–50)
HDLC SERPL-MCNC: 47 MG/DL
HGB BLD-MCNC: 13.9 G/DL (ref 13.2–17.1)
HGB UR QL STRIP: NEGATIVE
HYALINE CASTS #/AREA URNS LPF: ABNORMAL /LPF
KETONES UR QL STRIP: NEGATIVE
LDLC SERPL CALC-MCNC: 63 MG/DL (CALC)
LEUKOCYTE ESTERASE UR QL STRIP: NEGATIVE
LYMPHOCYTES # BLD AUTO: 1220 CELLS/UL (ref 850–3900)
LYMPHOCYTES NFR BLD AUTO: 12.2 %
MCH RBC QN AUTO: 27.9 PG (ref 27–33)
MCHC RBC AUTO-ENTMCNC: 31.9 G/DL (ref 32–36)
MCV RBC AUTO: 87.4 FL (ref 80–100)
MICROALBUMIN UR-MCNC: 97.1 MG/DL
MONOCYTES # BLD AUTO: 500 CELLS/UL (ref 200–950)
MONOCYTES NFR BLD AUTO: 5 %
NEUTROPHILS # BLD AUTO: 8250 CELLS/UL (ref 1500–7800)
NEUTROPHILS NFR BLD AUTO: 82.5 %
NITRITE UR QL STRIP: NEGATIVE
NONHDLC SERPL-MCNC: 83 MG/DL (CALC)
PH UR STRIP: 6 [PH] (ref 5–8)
PLATELET # BLD AUTO: 223 THOUSAND/UL (ref 140–400)
PMV BLD REES-ECKER: 9.8 FL (ref 7.5–12.5)
POTASSIUM SERPL-SCNC: 4.1 MMOL/L (ref 3.5–5.3)
PROT SERPL-MCNC: 6.9 G/DL (ref 6.1–8.1)
PROT UR QL STRIP: ABNORMAL
RBC # BLD AUTO: 4.99 MILLION/UL (ref 4.2–5.8)
RBC #/AREA URNS HPF: ABNORMAL /HPF
SERVICE CMNT-IMP: ABNORMAL
SODIUM SERPL-SCNC: 141 MMOL/L (ref 135–146)
SP GR UR STRIP: 1.04 (ref 1–1.03)
SQUAMOUS #/AREA URNS HPF: ABNORMAL /HPF
TRIGL SERPL-MCNC: 116 MG/DL
VIT B12 SERPL-MCNC: 363 PG/ML (ref 200–1100)
WBC # BLD AUTO: 10 THOUSAND/UL (ref 3.8–10.8)
WBC #/AREA URNS HPF: ABNORMAL /HPF

## 2025-08-18 ENCOUNTER — APPOINTMENT (OUTPATIENT)
Dept: PRIMARY CARE | Facility: CLINIC | Age: 55
End: 2025-08-18
Payer: MEDICARE

## 2025-08-18 VITALS
BODY MASS INDEX: 42.66 KG/M2 | WEIGHT: 315 LBS | OXYGEN SATURATION: 94 % | HEART RATE: 78 BPM | HEIGHT: 72 IN | SYSTOLIC BLOOD PRESSURE: 144 MMHG | DIASTOLIC BLOOD PRESSURE: 80 MMHG

## 2025-08-18 DIAGNOSIS — N52.1 ERECTILE DYSFUNCTION DUE TO TYPE 2 DIABETES MELLITUS (MULTI): ICD-10-CM

## 2025-08-18 DIAGNOSIS — E11.65 TYPE 2 DIABETES MELLITUS WITH HYPERGLYCEMIA, WITH LONG-TERM CURRENT USE OF INSULIN: ICD-10-CM

## 2025-08-18 DIAGNOSIS — I10 PRIMARY HYPERTENSION: ICD-10-CM

## 2025-08-18 DIAGNOSIS — Z00.00 ANNUAL PHYSICAL EXAM: Primary | ICD-10-CM

## 2025-08-18 DIAGNOSIS — Z79.4 TYPE 2 DIABETES MELLITUS WITH HYPERGLYCEMIA, WITH LONG-TERM CURRENT USE OF INSULIN: ICD-10-CM

## 2025-08-18 DIAGNOSIS — E11.69 ERECTILE DYSFUNCTION DUE TO TYPE 2 DIABETES MELLITUS (MULTI): ICD-10-CM

## 2025-08-18 DIAGNOSIS — R60.0 LOWER LEG EDEMA: ICD-10-CM

## 2025-08-18 PROCEDURE — 3077F SYST BP >= 140 MM HG: CPT

## 2025-08-18 PROCEDURE — 3008F BODY MASS INDEX DOCD: CPT

## 2025-08-18 PROCEDURE — 3079F DIAST BP 80-89 MM HG: CPT

## 2025-08-18 PROCEDURE — 3052F HG A1C>EQUAL 8.0%<EQUAL 9.0%: CPT

## 2025-08-18 PROCEDURE — 4010F ACE/ARB THERAPY RXD/TAKEN: CPT

## 2025-08-18 PROCEDURE — 99214 OFFICE O/P EST MOD 30 MIN: CPT

## 2025-08-18 PROCEDURE — 99396 PREV VISIT EST AGE 40-64: CPT

## 2025-08-18 RX ORDER — CHLORTHALIDONE 25 MG/1
25 TABLET ORAL DAILY
Qty: 30 TABLET | Refills: 0 | Status: SHIPPED | OUTPATIENT
Start: 2025-08-18 | End: 2025-09-17

## 2025-08-18 ASSESSMENT — COLUMBIA-SUICIDE SEVERITY RATING SCALE - C-SSRS: 1. IN THE PAST MONTH, HAVE YOU WISHED YOU WERE DEAD OR WISHED YOU COULD GO TO SLEEP AND NOT WAKE UP?: NO

## 2025-08-18 ASSESSMENT — PAIN SCALES - GENERAL: PAINLEVEL_OUTOF10: 7

## 2025-08-19 ENCOUNTER — APPOINTMENT (OUTPATIENT)
Dept: RADIOLOGY | Facility: CLINIC | Age: 55
End: 2025-08-19
Payer: MEDICARE

## 2025-09-02 DIAGNOSIS — M25.512 BILATERAL SHOULDER PAIN, UNSPECIFIED CHRONICITY: ICD-10-CM

## 2025-09-02 DIAGNOSIS — M1A.09X0 CHRONIC GOUT OF MULTIPLE SITES, UNSPECIFIED CAUSE: ICD-10-CM

## 2025-09-02 DIAGNOSIS — E78.00 HIGH CHOLESTEROL: ICD-10-CM

## 2025-09-02 DIAGNOSIS — M48.02 CERVICAL SPINAL STENOSIS: ICD-10-CM

## 2025-09-02 DIAGNOSIS — E11.9 TYPE 2 DIABETES MELLITUS WITHOUT COMPLICATION, WITHOUT LONG-TERM CURRENT USE OF INSULIN: ICD-10-CM

## 2025-09-02 DIAGNOSIS — I10 PRIMARY HYPERTENSION: ICD-10-CM

## 2025-09-02 DIAGNOSIS — M25.511 BILATERAL SHOULDER PAIN, UNSPECIFIED CHRONICITY: ICD-10-CM

## 2025-09-02 RX ORDER — TIZANIDINE 4 MG/1
4 TABLET ORAL NIGHTLY
Qty: 90 TABLET | Refills: 0 | Status: SHIPPED | OUTPATIENT
Start: 2025-09-02

## 2025-09-03 RX ORDER — ROSUVASTATIN CALCIUM 5 MG/1
5 TABLET, COATED ORAL DAILY
Qty: 90 TABLET | Refills: 0 | Status: SHIPPED | OUTPATIENT
Start: 2025-09-03

## 2025-09-03 RX ORDER — ALLOPURINOL 300 MG/1
300 TABLET ORAL DAILY
Qty: 90 TABLET | Refills: 0 | Status: SHIPPED | OUTPATIENT
Start: 2025-09-03

## 2025-09-03 RX ORDER — METOPROLOL SUCCINATE 100 MG/1
100 TABLET, EXTENDED RELEASE ORAL DAILY
Qty: 90 TABLET | Refills: 0 | Status: SHIPPED | OUTPATIENT
Start: 2025-09-03

## 2025-09-03 RX ORDER — GLIPIZIDE 10 MG/1
10 TABLET ORAL
Qty: 180 TABLET | Refills: 0 | Status: SHIPPED | OUTPATIENT
Start: 2025-09-03

## 2025-09-05 ENCOUNTER — APPOINTMENT (OUTPATIENT)
Dept: RADIOLOGY | Facility: CLINIC | Age: 55
End: 2025-09-05
Payer: MEDICARE

## 2025-09-08 ENCOUNTER — APPOINTMENT (OUTPATIENT)
Dept: RADIOLOGY | Facility: CLINIC | Age: 55
End: 2025-09-08
Payer: MEDICARE

## 2025-09-25 ENCOUNTER — APPOINTMENT (OUTPATIENT)
Dept: PRIMARY CARE | Facility: CLINIC | Age: 55
End: 2025-09-25
Payer: MEDICARE

## (undated) DEVICE — MANIFOLD, 4 PORT NEPTUNE STANDARD

## (undated) DEVICE — APPLICATOR, PREP, CHLORAPREP, W/ORANGE TINT, 10.5ML

## (undated) DEVICE — ELECTRODE, ELECTROSURGICAL, BLADE, INSULATED, ENT/IMA, STERILE

## (undated) DEVICE — DRAIN, WOUND, FLAT, HUBLESS, FULL LENGTH PERFORATION, 7 MM X 20 CM

## (undated) DEVICE — DRESSING, SPONGE, GAUZE, CURITY, 4 X 4 IN, STERILE

## (undated) DEVICE — COVER, CART, 45 X 27 X 48 IN, CLEAR

## (undated) DEVICE — REST, HEAD, BAGEL, 9 IN

## (undated) DEVICE — SUTURE, VICRYL, 4-0, 18 IN, UNDYED BR PS-2

## (undated) DEVICE — EVACUATOR, WOUND, SUCTION, CLOSED, JACKSON-PRATT, 100 CC, SILICONE

## (undated) DEVICE — BUR,  3MM X 3.8MM, NEURODRILL, LESS AGGR

## (undated) DEVICE — Device

## (undated) DEVICE — SPONGE, DISSECTOR, PEANUT, 3/8, STERILE 5 FOAM HOLDER"

## (undated) DEVICE — DRAPE, MICROSCOPE, FOR ZEISS 65MM, VARI-LENS II, 52 X 150

## (undated) DEVICE — COVER, TABLE, UHC

## (undated) DEVICE — CATHETER TRAY, SURESTEP, 16FR, URINE METER W/STATLOCK

## (undated) DEVICE — SPONGE GAUZE, XRAY SC+RFID, 4X4 16 PLY, STERILE

## (undated) DEVICE — SUTURE, VICRYL, 3-0, 18 IN, PS2, UNDYED

## (undated) DEVICE — SPONGE, LAP, XRAY DECT, 18IN X 18IN, W/LOOP, STERILE